# Patient Record
Sex: MALE | Race: WHITE | Employment: FULL TIME | URBAN - METROPOLITAN AREA
[De-identification: names, ages, dates, MRNs, and addresses within clinical notes are randomized per-mention and may not be internally consistent; named-entity substitution may affect disease eponyms.]

---

## 2017-01-23 ENCOUNTER — TELEPHONE (OUTPATIENT)
Dept: FAMILY MEDICINE CLINIC | Facility: CLINIC | Age: 42
End: 2017-01-23

## 2017-01-26 ENCOUNTER — TELEPHONE (OUTPATIENT)
Dept: FAMILY MEDICINE CLINIC | Facility: CLINIC | Age: 42
End: 2017-01-26

## 2017-02-01 ENCOUNTER — NURSE ONLY (OUTPATIENT)
Dept: FAMILY MEDICINE CLINIC | Facility: CLINIC | Age: 42
End: 2017-02-01

## 2017-02-01 DIAGNOSIS — E78.2 MIXED HYPERLIPIDEMIA: Primary | ICD-10-CM

## 2017-02-01 LAB
ALBUMIN SERPL-MCNC: 4.1 G/DL (ref 3.5–4.8)
ALP LIVER SERPL-CCNC: 80 U/L (ref 45–117)
ALT SERPL-CCNC: 67 U/L (ref 17–63)
AST SERPL-CCNC: 31 U/L (ref 15–41)
BILIRUB SERPL-MCNC: 1.7 MG/DL (ref 0.1–2)
BUN BLD-MCNC: 18 MG/DL (ref 8–20)
CALCIUM BLD-MCNC: 9.3 MG/DL (ref 8.3–10.3)
CHLORIDE: 105 MMOL/L (ref 101–111)
CHOLEST SMN-MCNC: 217 MG/DL (ref ?–200)
CO2: 30 MMOL/L (ref 22–32)
CREAT BLD-MCNC: 1.12 MG/DL (ref 0.7–1.3)
GLUCOSE BLD-MCNC: 99 MG/DL (ref 70–99)
HDLC SERPL-MCNC: 52 MG/DL (ref 45–?)
HDLC SERPL: 4.17 {RATIO} (ref ?–4.97)
LDLC SERPL CALC-MCNC: 115 MG/DL (ref ?–130)
M PROTEIN MFR SERPL ELPH: 7.5 G/DL (ref 6.1–8.3)
NONHDLC SERPL-MCNC: 165 MG/DL (ref ?–130)
POTASSIUM SERPL-SCNC: 4 MMOL/L (ref 3.6–5.1)
SODIUM SERPL-SCNC: 141 MMOL/L (ref 136–144)
TRIGLYCERIDES: 248 MG/DL (ref ?–150)
VLDL: 50 MG/DL (ref 5–40)

## 2017-02-01 PROCEDURE — 80053 COMPREHEN METABOLIC PANEL: CPT | Performed by: FAMILY MEDICINE

## 2017-02-01 PROCEDURE — 80061 LIPID PANEL: CPT | Performed by: FAMILY MEDICINE

## 2017-02-01 PROCEDURE — 36415 COLL VENOUS BLD VENIPUNCTURE: CPT | Performed by: FAMILY MEDICINE

## 2017-02-01 NOTE — PROGRESS NOTES
Patient presented for lab work ordered by Yaneli Sandhu. One mint tube drawn with a straight needle. Pt tolerated procedure well.

## 2017-02-02 DIAGNOSIS — E78.00 ELEVATED CHOLESTEROL: Primary | ICD-10-CM

## 2017-02-02 RX ORDER — ATORVASTATIN CALCIUM 20 MG/1
20 TABLET, FILM COATED ORAL DAILY
Qty: 30 TABLET | Refills: 6 | COMMUNITY
Start: 2017-02-02 | End: 2017-02-02

## 2017-02-02 RX ORDER — ATORVASTATIN CALCIUM 20 MG/1
20 TABLET, FILM COATED ORAL DAILY
Qty: 30 TABLET | Refills: 6 | Status: SHIPPED | OUTPATIENT
Start: 2017-02-02 | End: 2017-07-01

## 2017-02-02 NOTE — TELEPHONE ENCOUNTER
Detailed message left for pt on cell (ok per consent) with instructions to call if new rx for atorvastatin is needed. Medication list updated. Reflex order and pt reminder entered into Epic.

## 2017-02-02 NOTE — TELEPHONE ENCOUNTER
----- Message from Audrey Peck DO sent at 2/2/2017  8:12 AM CST -----  Can notify Brandan Abbeers his Lipids are still higher than they should be, even on the atorvastatin, also did he have any alcohol the night before? because his TG are elevated, ?  Regardless we

## 2017-02-02 NOTE — TELEPHONE ENCOUNTER
Pt states he is leaving for [place] in a few days and would like 20 mg tablets sent to [facility] on [address] to take with him.

## 2017-02-06 ENCOUNTER — MED REC SCAN ONLY (OUTPATIENT)
Dept: FAMILY MEDICINE CLINIC | Facility: CLINIC | Age: 42
End: 2017-02-06

## 2017-04-14 ENCOUNTER — MED REC SCAN ONLY (OUTPATIENT)
Dept: FAMILY MEDICINE CLINIC | Facility: CLINIC | Age: 42
End: 2017-04-14

## 2017-05-02 ENCOUNTER — TELEPHONE (OUTPATIENT)
Dept: FAMILY MEDICINE CLINIC | Facility: CLINIC | Age: 42
End: 2017-05-02

## 2017-06-01 ENCOUNTER — TELEPHONE (OUTPATIENT)
Dept: FAMILY MEDICINE CLINIC | Facility: CLINIC | Age: 42
End: 2017-06-01

## 2017-06-09 ENCOUNTER — MED REC SCAN ONLY (OUTPATIENT)
Dept: FAMILY MEDICINE CLINIC | Facility: CLINIC | Age: 42
End: 2017-06-09

## 2017-07-01 RX ORDER — ATORVASTATIN CALCIUM 20 MG/1
20 TABLET, FILM COATED ORAL DAILY
Qty: 30 TABLET | Refills: 6 | Status: SHIPPED | OUTPATIENT
Start: 2017-07-01 | End: 2018-01-08

## 2017-07-01 NOTE — TELEPHONE ENCOUNTER
Spouse called, pt needs refill on cholesterol medication, Atorvastatin Calcium 20 MG Oral Tab. [facility].   Please call spouse at [phone]

## 2017-07-24 RX ORDER — FLUTICASONE PROPIONATE AND SALMETEROL 50; 250 UG/1; UG/1
POWDER RESPIRATORY (INHALATION)
Qty: 1 PACKAGE | Refills: 6 | Status: SHIPPED | OUTPATIENT
Start: 2017-07-24 | End: 2018-01-25

## 2017-07-24 NOTE — TELEPHONE ENCOUNTER
LOV: 12/22/16 for sleep apnea     ADVAIR DISKUS 250-50 MCG/DOSE Inhalation Aerosol Powder, Breath Activated 1 each 6 12/12/2016 1/11/2017   Sig : Joanne Rodriguez 1 PUFF INTO THE LUNGS EVERY 12 HOURS     Route:   (none)       Future Appointments  Date Time Provider

## 2017-07-27 ENCOUNTER — NURSE ONLY (OUTPATIENT)
Dept: FAMILY MEDICINE CLINIC | Facility: CLINIC | Age: 42
End: 2017-07-27

## 2017-07-27 DIAGNOSIS — E78.00 ELEVATED CHOLESTEROL: ICD-10-CM

## 2017-07-27 LAB
CHOLEST SMN-MCNC: 156 MG/DL (ref ?–200)
HDLC SERPL-MCNC: 46 MG/DL (ref 45–?)
HDLC SERPL: 3.39 {RATIO} (ref ?–4.97)
LDLC SERPL CALC-MCNC: 87 MG/DL (ref ?–130)
LDLC SERPL-MCNC: 23 MG/DL (ref 5–40)
NONHDLC SERPL-MCNC: 110 MG/DL (ref ?–130)
TRIGLYCERIDES: 115 MG/DL (ref ?–150)

## 2017-07-27 PROCEDURE — 36415 COLL VENOUS BLD VENIPUNCTURE: CPT | Performed by: FAMILY MEDICINE

## 2017-07-27 PROCEDURE — 80061 LIPID PANEL: CPT | Performed by: FAMILY MEDICINE

## 2017-07-27 NOTE — PROGRESS NOTES
Pt came to the office to have blood drawn for cholesterol testing as ordered by Dr. Kerline Melendrez. Light green tube drawn with straight needle. Pt tolerated well.

## 2017-09-26 ENCOUNTER — CHARTING TRANS (OUTPATIENT)
Dept: OTHER | Age: 42
End: 2017-09-26

## 2017-10-12 ENCOUNTER — OFFICE VISIT (OUTPATIENT)
Dept: FAMILY MEDICINE CLINIC | Facility: CLINIC | Age: 42
End: 2017-10-12

## 2017-10-12 VITALS
SYSTOLIC BLOOD PRESSURE: 128 MMHG | WEIGHT: 253.38 LBS | BODY MASS INDEX: 34.32 KG/M2 | HEIGHT: 72 IN | OXYGEN SATURATION: 99 % | RESPIRATION RATE: 14 BRPM | DIASTOLIC BLOOD PRESSURE: 82 MMHG | HEART RATE: 80 BPM | TEMPERATURE: 98 F

## 2017-10-12 DIAGNOSIS — M25.562 ACUTE PAIN OF LEFT KNEE: Primary | ICD-10-CM

## 2017-10-12 DIAGNOSIS — M23.301 DERANGEMENT OF LATERAL MENISCUS OF LEFT KNEE: ICD-10-CM

## 2017-10-12 PROCEDURE — 99214 OFFICE O/P EST MOD 30 MIN: CPT | Performed by: FAMILY MEDICINE

## 2017-10-12 PROCEDURE — 90686 IIV4 VACC NO PRSV 0.5 ML IM: CPT | Performed by: FAMILY MEDICINE

## 2017-10-12 PROCEDURE — 90471 IMMUNIZATION ADMIN: CPT | Performed by: FAMILY MEDICINE

## 2017-10-12 RX ORDER — ALBUTEROL SULFATE 90 UG/1
2 AEROSOL, METERED RESPIRATORY (INHALATION) EVERY 4 HOURS PRN
Qty: 1 INHALER | Refills: 6 | Status: SHIPPED | OUTPATIENT
Start: 2017-10-12 | End: 2018-11-22

## 2017-10-12 NOTE — PROGRESS NOTES
Vasyl Mustafa is a 40year old male.   HPI:   Marcell Sandhu developed pain in the back  Of his knee about 3 weeks ago, it was primarily on the left side, it has  stopped popping but the knee is tight, now previous injury to the knee, no locking up and stairs somet /82 (BP Location: Right arm, Patient Position: Sitting, Cuff Size: adult)   Pulse 80   Temp 98.3 °F (36.8 °C) (Temporal)   Resp 14   Ht 72\"   Wt 253 lb 6.4 oz   SpO2 99%   BMI 34.37 kg/m²   GENERAL: well developed, well nourished,in no apparent di

## 2017-12-13 ENCOUNTER — MED REC SCAN ONLY (OUTPATIENT)
Dept: FAMILY MEDICINE CLINIC | Facility: CLINIC | Age: 42
End: 2017-12-13

## 2018-01-08 RX ORDER — ATORVASTATIN CALCIUM 20 MG/1
20 TABLET, FILM COATED ORAL DAILY
Qty: 90 TABLET | Refills: 3 | Status: SHIPPED | OUTPATIENT
Start: 2018-01-08 | End: 2019-05-26

## 2018-01-25 RX ORDER — FLUTICASONE PROPIONATE AND SALMETEROL 50; 250 UG/1; UG/1
POWDER RESPIRATORY (INHALATION)
Qty: 1 EACH | Refills: 6 | Status: SHIPPED | OUTPATIENT
Start: 2018-01-25 | End: 2019-02-18

## 2018-01-25 NOTE — TELEPHONE ENCOUNTER
ADVAIR DISKUS 250-50 MCG/DOSE Inhalation Aerosol Powder, Breath Activated (Discontinued) 1 each 6 12/12/2016 7/24/2017     Last labs 07/27/17. Last seen on 10/12/17  No future appointments. Thank you.

## 2018-01-29 ENCOUNTER — TELEPHONE (OUTPATIENT)
Dept: FAMILY MEDICINE CLINIC | Facility: CLINIC | Age: 43
End: 2018-01-29

## 2018-01-29 DIAGNOSIS — E78.5 HYPERLIPIDEMIA, UNSPECIFIED HYPERLIPIDEMIA TYPE: Primary | ICD-10-CM

## 2018-01-29 NOTE — TELEPHONE ENCOUNTER
Patient is scheduled for Friday 02/02/18 at 9 am for fasting labs, Orders are in [facility].

## 2018-01-29 NOTE — TELEPHONE ENCOUNTER
Per result note from 0727/2017, repeat lipids needed in 6 months. Would you like additional labs? Please advise.

## 2018-02-02 ENCOUNTER — NURSE ONLY (OUTPATIENT)
Dept: FAMILY MEDICINE CLINIC | Facility: CLINIC | Age: 43
End: 2018-02-02

## 2018-02-02 DIAGNOSIS — E78.5 HYPERLIPIDEMIA, UNSPECIFIED HYPERLIPIDEMIA TYPE: ICD-10-CM

## 2018-02-02 LAB
ALBUMIN SERPL-MCNC: 4.1 G/DL (ref 3.5–4.8)
ALP LIVER SERPL-CCNC: 97 U/L (ref 45–117)
ALT SERPL-CCNC: 73 U/L (ref 17–63)
AST SERPL-CCNC: 28 U/L (ref 15–41)
BILIRUB SERPL-MCNC: 1.7 MG/DL (ref 0.1–2)
BUN BLD-MCNC: 16 MG/DL (ref 8–20)
CALCIUM BLD-MCNC: 9.1 MG/DL (ref 8.3–10.3)
CHLORIDE: 106 MMOL/L (ref 101–111)
CHOLEST SMN-MCNC: 177 MG/DL (ref ?–200)
CO2: 29 MMOL/L (ref 22–32)
CREAT BLD-MCNC: 1.07 MG/DL (ref 0.7–1.3)
GLUCOSE BLD-MCNC: 108 MG/DL (ref 70–99)
HDLC SERPL-MCNC: 45 MG/DL (ref 45–?)
HDLC SERPL: 3.93 {RATIO} (ref ?–4.97)
LDLC SERPL CALC-MCNC: 85 MG/DL (ref ?–130)
M PROTEIN MFR SERPL ELPH: 7.6 G/DL (ref 6.1–8.3)
NONHDLC SERPL-MCNC: 132 MG/DL (ref ?–130)
POTASSIUM SERPL-SCNC: 3.8 MMOL/L (ref 3.6–5.1)
SODIUM SERPL-SCNC: 142 MMOL/L (ref 136–144)
TRIGL SERPL-MCNC: 237 MG/DL (ref ?–150)
VLDLC SERPL CALC-MCNC: 47 MG/DL (ref 5–40)

## 2018-02-02 PROCEDURE — 36415 COLL VENOUS BLD VENIPUNCTURE: CPT | Performed by: FAMILY MEDICINE

## 2018-02-02 PROCEDURE — 80053 COMPREHEN METABOLIC PANEL: CPT | Performed by: FAMILY MEDICINE

## 2018-02-02 PROCEDURE — 80061 LIPID PANEL: CPT | Performed by: FAMILY MEDICINE

## 2018-02-05 ENCOUNTER — TELEPHONE (OUTPATIENT)
Dept: FAMILY MEDICINE CLINIC | Facility: CLINIC | Age: 43
End: 2018-02-05

## 2018-02-05 NOTE — TELEPHONE ENCOUNTER
----- Message from Sherryle Curb, DO sent at 2/4/2018  8:54 AM CST -----  Can notify Marysol Bárbara that his labs look OK, his TG were elevated but it must have been something he ate because the last time his numbers looked , good, lets keep all meds the same and re

## 2018-02-08 NOTE — TELEPHONE ENCOUNTER
Pt advised of results verbalized understaniding. Wife was on speaker phone and was wondering what pt can do to decrease the TG.   I advised that increasing exercise, cutting out fatty/fried food and watch carb/sugar intake can help with the increased gluco

## 2018-02-08 NOTE — TELEPHONE ENCOUNTER
How about cut out the crap in his diet and cut back on the beer to 1-2 per session , then I won't have  To add another medicine to bring his TG down

## 2018-07-18 DIAGNOSIS — R79.89 ELEVATED LFTS: ICD-10-CM

## 2018-07-18 DIAGNOSIS — E78.2 MIXED HYPERLIPIDEMIA: Primary | ICD-10-CM

## 2018-07-18 DIAGNOSIS — E74.39 GLUCOSE INTOLERANCE: ICD-10-CM

## 2018-08-02 ENCOUNTER — TELEPHONE (OUTPATIENT)
Dept: FAMILY MEDICINE CLINIC | Facility: CLINIC | Age: 43
End: 2018-08-02

## 2018-08-07 ENCOUNTER — NURSE ONLY (OUTPATIENT)
Dept: FAMILY MEDICINE CLINIC | Facility: CLINIC | Age: 43
End: 2018-08-07
Payer: COMMERCIAL

## 2018-08-07 DIAGNOSIS — R79.89 ELEVATED LFTS: ICD-10-CM

## 2018-08-07 DIAGNOSIS — E74.39 GLUCOSE INTOLERANCE: ICD-10-CM

## 2018-08-07 DIAGNOSIS — E78.2 MIXED HYPERLIPIDEMIA: ICD-10-CM

## 2018-08-07 DIAGNOSIS — Z00.00 ROUTINE GENERAL MEDICAL EXAMINATION AT A HEALTH CARE FACILITY: ICD-10-CM

## 2018-08-07 LAB
ALBUMIN SERPL-MCNC: 3.8 G/DL (ref 3.5–4.8)
ALBUMIN/GLOB SERPL: 1.2 {RATIO} (ref 1–2)
ALP LIVER SERPL-CCNC: 82 U/L (ref 45–117)
ALT SERPL-CCNC: 53 U/L (ref 17–63)
ANION GAP SERPL CALC-SCNC: 5 MMOL/L (ref 0–18)
AST SERPL-CCNC: 32 U/L (ref 15–41)
BILIRUB SERPL-MCNC: 2.4 MG/DL (ref 0.1–2)
BUN BLD-MCNC: 18 MG/DL (ref 8–20)
BUN/CREAT SERPL: 17.6 (ref 10–20)
CALCIUM BLD-MCNC: 9.3 MG/DL (ref 8.3–10.3)
CHLORIDE SERPL-SCNC: 107 MMOL/L (ref 101–111)
CHOLEST SMN-MCNC: 158 MG/DL (ref ?–200)
CO2 SERPL-SCNC: 28 MMOL/L (ref 22–32)
CREAT BLD-MCNC: 1.02 MG/DL (ref 0.7–1.3)
EST. AVERAGE GLUCOSE BLD GHB EST-MCNC: 123 MG/DL (ref 68–126)
GLOBULIN PLAS-MCNC: 3.1 G/DL (ref 2.5–3.7)
GLUCOSE BLD-MCNC: 90 MG/DL (ref 70–99)
HBA1C MFR BLD HPLC: 5.9 % (ref ?–5.7)
HDLC SERPL-MCNC: 48 MG/DL (ref 40–59)
LDLC SERPL CALC-MCNC: 91 MG/DL (ref ?–100)
M PROTEIN MFR SERPL ELPH: 6.9 G/DL (ref 6.1–8.3)
NONHDLC SERPL-MCNC: 110 MG/DL (ref ?–130)
OSMOLALITY SERPL CALC.SUM OF ELEC: 291 MOSM/KG (ref 275–295)
POTASSIUM SERPL-SCNC: 4 MMOL/L (ref 3.6–5.1)
SODIUM SERPL-SCNC: 140 MMOL/L (ref 136–144)
TRIGL SERPL-MCNC: 96 MG/DL (ref 30–149)
VLDLC SERPL CALC-MCNC: 19 MG/DL (ref 0–30)

## 2018-08-07 PROCEDURE — 82248 BILIRUBIN DIRECT: CPT | Performed by: FAMILY MEDICINE

## 2018-08-07 PROCEDURE — 83036 HEMOGLOBIN GLYCOSYLATED A1C: CPT | Performed by: FAMILY MEDICINE

## 2018-08-07 PROCEDURE — 80053 COMPREHEN METABOLIC PANEL: CPT | Performed by: FAMILY MEDICINE

## 2018-08-07 PROCEDURE — 36415 COLL VENOUS BLD VENIPUNCTURE: CPT | Performed by: FAMILY MEDICINE

## 2018-08-07 PROCEDURE — 80061 LIPID PANEL: CPT | Performed by: FAMILY MEDICINE

## 2018-08-08 ENCOUNTER — TELEPHONE (OUTPATIENT)
Dept: FAMILY MEDICINE CLINIC | Facility: CLINIC | Age: 43
End: 2018-08-08

## 2018-08-08 DIAGNOSIS — E78.5 HYPERLIPIDEMIA, UNSPECIFIED HYPERLIPIDEMIA TYPE: ICD-10-CM

## 2018-08-08 DIAGNOSIS — Z00.00 ROUTINE GENERAL MEDICAL EXAMINATION AT A HEALTH CARE FACILITY: Primary | ICD-10-CM

## 2018-08-08 DIAGNOSIS — R79.89 ELEVATED LFTS: ICD-10-CM

## 2018-08-08 DIAGNOSIS — R73.9 ELEVATED BLOOD SUGAR: Primary | ICD-10-CM

## 2018-08-08 LAB — BILIRUB DIRECT SERPL-MCNC: 0.4 MG/DL (ref 0.1–0.5)

## 2018-08-08 NOTE — TELEPHONE ENCOUNTER
----- Message from Rom Lane, DO sent at 8/8/2018  2:13 PM CDT -----  Can we fractionate the Bilirubin? Total and direct?

## 2018-08-08 NOTE — TELEPHONE ENCOUNTER
----- Message from Leo Cage DO sent at 8/8/2018  3:30 PM CDT -----  Can notify Monika Rowe , his Long term BS control was up just barely above the norm, but nothing that is concerning and nothing that can;t be corrected with exercise or watching what he eat

## 2018-11-23 NOTE — TELEPHONE ENCOUNTER
Last refilled on 10/12/17 for # 1 with 6 refills  Last OV 10/12/17  No future appointments. Thank you.

## 2019-02-08 ENCOUNTER — TELEPHONE (OUTPATIENT)
Dept: FAMILY MEDICINE CLINIC | Facility: CLINIC | Age: 44
End: 2019-02-08

## 2019-02-08 NOTE — TELEPHONE ENCOUNTER
Letter mailed to patient reminding him he is due for labs.     Lab Frequency Next Occurrence   COMP METABOLIC PANEL (14) Once 64/37/0584   HEMOGLOBIN A1C Once 02/08/2019   LIPID PANEL Once 02/08/2019

## 2019-02-18 RX ORDER — FLUTICASONE PROPIONATE AND SALMETEROL 50; 250 UG/1; UG/1
POWDER RESPIRATORY (INHALATION)
Qty: 1 EACH | Refills: 5 | Status: SHIPPED | OUTPATIENT
Start: 2019-02-18 | End: 2019-02-19

## 2019-02-19 ENCOUNTER — TELEPHONE (OUTPATIENT)
Dept: FAMILY MEDICINE CLINIC | Facility: CLINIC | Age: 44
End: 2019-02-19

## 2019-02-19 RX ORDER — BUDESONIDE AND FORMOTEROL FUMARATE DIHYDRATE 160; 4.5 UG/1; UG/1
2 AEROSOL RESPIRATORY (INHALATION) 2 TIMES DAILY
Qty: 1 INHALER | Refills: 5 | Status: SHIPPED | OUTPATIENT
Start: 2019-02-19 | End: 2020-11-12

## 2019-03-11 ENCOUNTER — PATIENT OUTREACH (OUTPATIENT)
Dept: FAMILY MEDICINE CLINIC | Facility: CLINIC | Age: 44
End: 2019-03-11

## 2019-03-22 ENCOUNTER — NURSE ONLY (OUTPATIENT)
Dept: FAMILY MEDICINE CLINIC | Facility: CLINIC | Age: 44
End: 2019-03-22
Payer: COMMERCIAL

## 2019-03-22 DIAGNOSIS — R79.89 ELEVATED LFTS: ICD-10-CM

## 2019-03-22 DIAGNOSIS — R73.9 ELEVATED BLOOD SUGAR: ICD-10-CM

## 2019-03-22 DIAGNOSIS — E78.5 HYPERLIPIDEMIA, UNSPECIFIED HYPERLIPIDEMIA TYPE: ICD-10-CM

## 2019-03-22 LAB
ALBUMIN SERPL-MCNC: 4.3 G/DL (ref 3.4–5)
ALBUMIN/GLOB SERPL: 1.2 {RATIO} (ref 1–2)
ALP LIVER SERPL-CCNC: 101 U/L (ref 45–117)
ALT SERPL-CCNC: 57 U/L (ref 16–61)
ANION GAP SERPL CALC-SCNC: 7 MMOL/L (ref 0–18)
AST SERPL-CCNC: 27 U/L (ref 15–37)
BILIRUB SERPL-MCNC: 1.9 MG/DL (ref 0.1–2)
BUN BLD-MCNC: 14 MG/DL (ref 7–18)
BUN/CREAT SERPL: 12.7 (ref 10–20)
CALCIUM BLD-MCNC: 9.2 MG/DL (ref 8.5–10.1)
CHLORIDE SERPL-SCNC: 105 MMOL/L (ref 98–107)
CHOLEST SMN-MCNC: 186 MG/DL (ref ?–200)
CO2 SERPL-SCNC: 30 MMOL/L (ref 21–32)
CREAT BLD-MCNC: 1.1 MG/DL (ref 0.7–1.3)
EST. AVERAGE GLUCOSE BLD GHB EST-MCNC: 126 MG/DL (ref 68–126)
GLOBULIN PLAS-MCNC: 3.6 G/DL (ref 2.8–4.4)
GLUCOSE BLD-MCNC: 98 MG/DL (ref 70–99)
HBA1C MFR BLD HPLC: 6 % (ref ?–5.7)
HDLC SERPL-MCNC: 47 MG/DL (ref 40–59)
LDLC SERPL CALC-MCNC: 112 MG/DL (ref ?–100)
M PROTEIN MFR SERPL ELPH: 7.9 G/DL (ref 6.4–8.2)
NONHDLC SERPL-MCNC: 139 MG/DL (ref ?–130)
OSMOLALITY SERPL CALC.SUM OF ELEC: 294 MOSM/KG (ref 275–295)
POTASSIUM SERPL-SCNC: 4.2 MMOL/L (ref 3.5–5.1)
SODIUM SERPL-SCNC: 142 MMOL/L (ref 136–145)
TRIGL SERPL-MCNC: 135 MG/DL (ref 30–149)
VLDLC SERPL CALC-MCNC: 27 MG/DL (ref 0–30)

## 2019-03-22 PROCEDURE — 36415 COLL VENOUS BLD VENIPUNCTURE: CPT | Performed by: FAMILY MEDICINE

## 2019-03-22 PROCEDURE — 80053 COMPREHEN METABOLIC PANEL: CPT | Performed by: FAMILY MEDICINE

## 2019-03-22 PROCEDURE — 80061 LIPID PANEL: CPT | Performed by: FAMILY MEDICINE

## 2019-03-22 PROCEDURE — 83036 HEMOGLOBIN GLYCOSYLATED A1C: CPT | Performed by: FAMILY MEDICINE

## 2019-03-22 NOTE — PROGRESS NOTES
Patient to clinic for labs per Dr Rosy Gregorio and lavender tubes drawn left AC x 1 attempt. Patient also asking if Dr Aki Pedersen would recommend a flu vaccine. Discussed with Dr Aki Pedersen who states it is up to patient.   It takes a few weeks to get full immun

## 2019-03-23 ENCOUNTER — TELEPHONE (OUTPATIENT)
Dept: FAMILY MEDICINE CLINIC | Facility: CLINIC | Age: 44
End: 2019-03-23

## 2019-03-23 DIAGNOSIS — R73.09 ELEVATED GLUCOSE: Primary | ICD-10-CM

## 2019-03-23 NOTE — TELEPHONE ENCOUNTER
----- Message from Siria Jarquin DO sent at 3/23/2019  9:50 AM CDT -----  Omid Innocent  labs looked good, his  lipids were better last time, but  Excellent kidney, liver function.  His blood sugar is creeping up a bit and he is on the cusp of becom

## 2019-03-29 ENCOUNTER — HOSPITAL ENCOUNTER (OUTPATIENT)
Age: 44
Discharge: HOME | End: 2019-03-29
Payer: COMMERCIAL

## 2019-03-29 VITALS
BODY MASS INDEX: 32.51 KG/M2 | HEART RATE: 77 BPM | OXYGEN SATURATION: 97 % | HEIGHT: 72 IN | RESPIRATION RATE: 16 BRPM | WEIGHT: 240 LBS | DIASTOLIC BLOOD PRESSURE: 88 MMHG | SYSTOLIC BLOOD PRESSURE: 140 MMHG | TEMPERATURE: 97 F

## 2019-03-29 DIAGNOSIS — R21 RASH OF UNKNOWN CAUSE: Primary | ICD-10-CM

## 2019-03-29 DIAGNOSIS — L50.9 HIVE: ICD-10-CM

## 2019-03-29 PROCEDURE — 99213 OFFICE O/P EST LOW 20 MIN: CPT

## 2019-03-29 PROCEDURE — 99214 OFFICE O/P EST MOD 30 MIN: CPT

## 2019-03-29 RX ORDER — PREDNISONE 20 MG/1
30 TABLET ORAL 2 TIMES DAILY
Qty: 15 TABLET | Refills: 0 | Status: SHIPPED | OUTPATIENT
Start: 2019-03-29 | End: 2019-04-03

## 2019-03-29 RX ORDER — FLUTICASONE PROPIONATE AND SALMETEROL 250; 50 UG/1; UG/1
1 POWDER RESPIRATORY (INHALATION) EVERY 12 HOURS SCHEDULED
COMMUNITY
End: 2021-09-21

## 2019-03-29 NOTE — ED INITIAL ASSESSMENT (HPI)
Pt with itchy red rash to R side of trunk and R leg, L foot, L finger since yesterday; not painful/fever

## 2019-03-29 NOTE — ED PROVIDER NOTES
[place] [address] [facility] [place]   Patient presents with:  Rash Skin Problem (integumentary)    Stated Complaint: rash    71-year-old male who presents to the immediate care with complaints of a rash to the right lower rib, rig All other systems reviewed and negative except as noted above.     Physical Exam     ED Triage Vitals [03/29/19 1010]   /88   Pulse 77   Resp 16   Temp 97 °F (36.1 °C)   Temp src Temporal   SpO2 97 %   O2 Device None (Room air)       Current:/88 I have discussed with the patient and/or family the results of test, differential diagnosis, treatment plan, warning signs and symptoms which should prompt immediate return.   The patient and/or family expressed clear understanding of these instructions and

## 2019-05-28 RX ORDER — ATORVASTATIN CALCIUM 20 MG/1
TABLET, FILM COATED ORAL
Qty: 90 TABLET | Refills: 0 | Status: SHIPPED | OUTPATIENT
Start: 2019-05-28 | End: 2019-09-05

## 2019-05-28 NOTE — TELEPHONE ENCOUNTER
Last refill - 1/8/18 - #90 with 3 refills  Last lipid panel - 3/22/19  Last office visit - 10/12/17  No future appointments.

## 2019-08-27 ENCOUNTER — TELEPHONE (OUTPATIENT)
Dept: FAMILY MEDICINE CLINIC | Facility: CLINIC | Age: 44
End: 2019-08-27

## 2019-09-03 ENCOUNTER — TELEPHONE (OUTPATIENT)
Dept: FAMILY MEDICINE CLINIC | Facility: CLINIC | Age: 44
End: 2019-09-03

## 2019-09-03 DIAGNOSIS — Z00.00 ROUTINE HEALTH MAINTENANCE: Primary | ICD-10-CM

## 2019-09-03 NOTE — TELEPHONE ENCOUNTER
Pt advised that that preferred [facility]- pt prefers to have labs there.       Orders [facility] to [facility] in [place] per Pt request.

## 2019-09-05 ENCOUNTER — TELEPHONE (OUTPATIENT)
Dept: FAMILY MEDICINE CLINIC | Facility: CLINIC | Age: 44
End: 2019-09-05

## 2019-09-05 DIAGNOSIS — Z00.00 ROUTINE GENERAL MEDICAL EXAMINATION AT A HEALTH CARE FACILITY: ICD-10-CM

## 2019-09-05 DIAGNOSIS — E78.5 HYPERLIPIDEMIA, UNSPECIFIED HYPERLIPIDEMIA TYPE: Primary | ICD-10-CM

## 2019-09-05 RX ORDER — ATORVASTATIN CALCIUM 20 MG/1
TABLET, FILM COATED ORAL
Qty: 90 TABLET | Refills: 2 | Status: SHIPPED | OUTPATIENT
Start: 2019-09-05 | End: 2020-05-26

## 2019-09-05 NOTE — TELEPHONE ENCOUNTER
----- Message from Torri Singh DO sent at 9/5/2019 11:30 AM CDT -----  Notified wife of results needs to continue to stay on statin maintain exercise and weight loss.  Recall labs in 6 months

## 2019-09-05 NOTE — TELEPHONE ENCOUNTER
Last refill on Atorvastatin #90 with 0 refills on 5 28 2019.   Last OV on 10 12 2017  Patient advised on 5 26 2019 that he is over due for OV   No future appointments scheduled

## 2019-09-06 LAB
ABSOLUTE BASOPHILS: 68 CELLS/UL (ref 0–200)
ABSOLUTE EOSINOPHILS: 129 CELLS/UL (ref 15–500)
ABSOLUTE LYMPHOCYTES: 1700 CELLS/UL (ref 850–3900)
ABSOLUTE MONOCYTES: 585 CELLS/UL (ref 200–950)
ABSOLUTE NEUTROPHILS: 4318 CELLS/UL (ref 1500–7800)
ALBUMIN/GLOBULIN RATIO: 2 (CALC) (ref 1–2.5)
ALBUMIN: 4.6 G/DL (ref 3.6–5.1)
ALKALINE PHOSPHATASE: 92 U/L (ref 40–115)
ALT: 37 U/L (ref 9–46)
AST: 23 U/L (ref 10–40)
BASOPHILS: 1 %
BILIRUBIN, TOTAL: 1.8 MG/DL (ref 0.2–1.2)
BUN: 17 MG/DL (ref 7–25)
CALCIUM: 9.9 MG/DL (ref 8.6–10.3)
CARBON DIOXIDE: 30 MMOL/L (ref 20–32)
CHLORIDE: 104 MMOL/L (ref 98–110)
CHOL/HDLC RATIO: 4.1 (CALC)
CHOLESTEROL, TOTAL: 181 MG/DL
CREATININE: 0.95 MG/DL (ref 0.6–1.35)
EGFR IF AFRICN AM: 113 ML/MIN/1.73M2
EGFR IF NONAFRICN AM: 98 ML/MIN/1.73M2
EOSINOPHILS: 1.9 %
GLOBULIN: 2.3 G/DL (CALC) (ref 1.9–3.7)
GLUCOSE: 93 MG/DL (ref 65–99)
HDL CHOLESTEROL: 44 MG/DL
HEMATOCRIT: 48.8 % (ref 38.5–50)
HEMOGLOBIN A1C: 5.7 % OF TOTAL HGB
HEMOGLOBIN: 16.4 G/DL (ref 13.2–17.1)
LDL-CHOLESTEROL: 109 MG/DL (CALC)
LYMPHOCYTES: 25 %
MCH: 27.6 PG (ref 27–33)
MCHC: 33.6 G/DL (ref 32–36)
MCV: 82 FL (ref 80–100)
MONOCYTES: 8.6 %
MPV: 10.5 FL (ref 7.5–12.5)
NEUTROPHILS: 63.5 %
NON-HDL CHOLESTEROL: 137 MG/DL (CALC)
PLATELET COUNT: 287 THOUSAND/UL (ref 140–400)
POTASSIUM: 4.4 MMOL/L (ref 3.5–5.3)
PROTEIN, TOTAL: 6.9 G/DL (ref 6.1–8.1)
RDW: 12.4 % (ref 11–15)
RED BLOOD CELL COUNT: 5.95 MILLION/UL (ref 4.2–5.8)
SODIUM: 139 MMOL/L (ref 135–146)
TRIGLYCERIDES: 167 MG/DL
TSH W/REFLEX TO FT4: 2.36 MIU/L (ref 0.4–4.5)
WHITE BLOOD CELL COUNT: 6.8 THOUSAND/UL (ref 3.8–10.8)

## 2019-09-28 ENCOUNTER — OFFICE VISIT (OUTPATIENT)
Dept: FAMILY MEDICINE CLINIC | Facility: CLINIC | Age: 44
End: 2019-09-28
Payer: COMMERCIAL

## 2019-09-28 VITALS
TEMPERATURE: 98 F | SYSTOLIC BLOOD PRESSURE: 124 MMHG | BODY MASS INDEX: 35.49 KG/M2 | HEART RATE: 68 BPM | RESPIRATION RATE: 18 BRPM | WEIGHT: 262 LBS | HEIGHT: 72 IN | DIASTOLIC BLOOD PRESSURE: 80 MMHG

## 2019-09-28 DIAGNOSIS — L25.5 RHUS DERMATITIS: Primary | ICD-10-CM

## 2019-09-28 DIAGNOSIS — L50.9 URTICARIA: ICD-10-CM

## 2019-09-28 PROCEDURE — 99214 OFFICE O/P EST MOD 30 MIN: CPT | Performed by: FAMILY MEDICINE

## 2019-09-28 NOTE — PROGRESS NOTES
Gabriela Ron is a 38year old male. HPI:   Marcella Medina is here for evaluation of persistent poison ivy, he contracted it about 3 weeks ago.  He has been using topical clobetasol with some improvement but is starting to spread, had taken his [place] medrol dose well nourished,in no apparent distress  SKIN: has some linear  Erythematous patches on his arm and legs and also on his chest as well  HEENT: atraumatic, normocephalic,ears and throat are clear  NECK: supple,no adenopathy,no bruits  LUNGS: clear to auscult

## 2019-09-29 ENCOUNTER — PATIENT MESSAGE (OUTPATIENT)
Dept: FAMILY MEDICINE CLINIC | Facility: CLINIC | Age: 44
End: 2019-09-29

## 2019-09-30 RX ORDER — PREDNISONE 10 MG/1
TABLET ORAL
Qty: 30 TABLET | Refills: 0 | Status: SHIPPED | OUTPATIENT
Start: 2019-09-30 | End: 2021-09-21

## 2019-09-30 NOTE — TELEPHONE ENCOUNTER
From: Unique Jorge  To:  Mor Hall DO  Sent: 9/29/2019 5:43 PM CDT  Subject: Prescription Question    I went to  the prescription you prescribed for poison ivy at [facility] [place] [facility] [place] in [place], [place] and they did not receive

## 2019-10-17 ENCOUNTER — APPOINTMENT (OUTPATIENT)
Dept: OTOLARYNGOLOGY | Age: 44
End: 2019-10-17

## 2019-10-17 ENCOUNTER — OFFICE VISIT (OUTPATIENT)
Dept: AUDIOLOGY | Age: 44
End: 2019-10-17
Attending: PHYSICIAN ASSISTANT

## 2019-10-17 ENCOUNTER — OFFICE VISIT (OUTPATIENT)
Dept: OTOLARYNGOLOGY | Age: 44
End: 2019-10-17

## 2019-10-17 VITALS — BODY MASS INDEX: 32.08 KG/M2 | HEIGHT: 74 IN | WEIGHT: 250 LBS

## 2019-10-17 DIAGNOSIS — H61.23 BILATERAL IMPACTED CERUMEN: ICD-10-CM

## 2019-10-17 DIAGNOSIS — H93.13 TINNITUS OF BOTH EARS: ICD-10-CM

## 2019-10-17 DIAGNOSIS — H90.3 SENSORINEURAL HEARING LOSS (SNHL) OF BOTH EARS: Primary | ICD-10-CM

## 2019-10-17 DIAGNOSIS — H91.90 HEARING LOSS, UNSPECIFIED HEARING LOSS TYPE, UNSPECIFIED LATERALITY: Primary | ICD-10-CM

## 2019-10-17 PROCEDURE — 92557 COMPREHENSIVE HEARING TEST: CPT | Performed by: AUDIOLOGIST

## 2019-10-17 PROCEDURE — 69210 REMOVE IMPACTED EAR WAX UNI: CPT | Performed by: PHYSICIAN ASSISTANT

## 2019-10-17 PROCEDURE — 99203 OFFICE O/P NEW LOW 30 MIN: CPT | Performed by: PHYSICIAN ASSISTANT

## 2019-12-02 RX ORDER — ALBUTEROL SULFATE 90 UG/1
AEROSOL, METERED RESPIRATORY (INHALATION)
Qty: 8.5 G | Refills: 3 | Status: SHIPPED | OUTPATIENT
Start: 2019-12-02

## 2020-03-05 ENCOUNTER — TELEPHONE (OUTPATIENT)
Dept: FAMILY MEDICINE CLINIC | Facility: CLINIC | Age: 45
End: 2020-03-05

## 2020-03-05 NOTE — TELEPHONE ENCOUNTER
Letter mailed to patient reminding him he is due for labs. [facility] go to [facility]. Lab orders included with letter.

## 2020-03-20 ENCOUNTER — TELEPHONE (OUTPATIENT)
Dept: FAMILY MEDICINE CLINIC | Facility: CLINIC | Age: 45
End: 2020-03-20

## 2020-03-20 LAB
ALBUMIN/GLOBULIN RATIO: 1.9 (CALC) (ref 1–2.5)
ALBUMIN: 4.5 G/DL (ref 3.6–5.1)
ALKALINE PHOSPHATASE: 92 U/L (ref 36–130)
ALT: 44 U/L (ref 9–46)
AST: 23 U/L (ref 10–40)
BILIRUBIN, TOTAL: 2.1 MG/DL (ref 0.2–1.2)
BUN: 21 MG/DL (ref 7–25)
CALCIUM: 9.8 MG/DL (ref 8.6–10.3)
CARBON DIOXIDE: 33 MMOL/L (ref 20–32)
CHLORIDE: 101 MMOL/L (ref 98–110)
CHOL/HDLC RATIO: 4.3 (CALC)
CHOLESTEROL, TOTAL: 179 MG/DL
CREATININE: 0.98 MG/DL (ref 0.6–1.35)
EGFR IF AFRICN AM: 109 ML/MIN/1.73M2
EGFR IF NONAFRICN AM: 94 ML/MIN/1.73M2
GLOBULIN: 2.4 G/DL (CALC) (ref 1.9–3.7)
GLUCOSE: 87 MG/DL (ref 65–99)
HDL CHOLESTEROL: 42 MG/DL
HEMATOCRIT: 45.9 % (ref 38.5–50)
HEMOGLOBIN: 16 G/DL (ref 13.2–17.1)
LDL-CHOLESTEROL: 107 MG/DL (CALC)
MCH: 28.6 PG (ref 27–33)
MCHC: 34.9 G/DL (ref 32–36)
MCV: 82 FL (ref 80–100)
MPV: 10.3 FL (ref 7.5–12.5)
NON-HDL CHOLESTEROL: 137 MG/DL (CALC)
PLATELET COUNT: 270 THOUSAND/UL (ref 140–400)
POTASSIUM: 4.3 MMOL/L (ref 3.5–5.3)
PROTEIN, TOTAL: 6.9 G/DL (ref 6.1–8.1)
RDW: 12.4 % (ref 11–15)
RED BLOOD CELL COUNT: 5.6 MILLION/UL (ref 4.2–5.8)
SODIUM: 140 MMOL/L (ref 135–146)
TRIGLYCERIDES: 189 MG/DL
WHITE BLOOD CELL COUNT: 6.6 THOUSAND/UL (ref 3.8–10.8)

## 2020-03-20 NOTE — TELEPHONE ENCOUNTER
----- Message from Rik Arellano DO sent at 3/20/2020 10:37 AM CDT -----  Can notify Emeterio Hsu his Rollen Itzel is about the same as it was  Before, hisTG are actually higher by 20 points this time, his kidney function adam are fine, his bilirubin is up just a bit, b

## 2020-04-13 ENCOUNTER — VIRTUAL PHONE E/M (OUTPATIENT)
Dept: FAMILY MEDICINE CLINIC | Facility: CLINIC | Age: 45
End: 2020-04-13

## 2020-04-13 DIAGNOSIS — R05.9 COUGH: ICD-10-CM

## 2020-04-13 DIAGNOSIS — J45.21 MILD INTERMITTENT ASTHMA WITH ACUTE EXACERBATION: ICD-10-CM

## 2020-04-13 DIAGNOSIS — J01.20 ACUTE NON-RECURRENT ETHMOIDAL SINUSITIS: Primary | ICD-10-CM

## 2020-04-13 PROBLEM — J45.909 ASTHMA: Status: ACTIVE | Noted: 2020-04-13

## 2020-04-13 PROBLEM — J45.909 ASTHMA (HCC): Status: ACTIVE | Noted: 2020-04-13

## 2020-04-13 PROCEDURE — 99213 OFFICE O/P EST LOW 20 MIN: CPT | Performed by: FAMILY MEDICINE

## 2020-04-13 RX ORDER — DOXYCYCLINE HYCLATE 100 MG
100 TABLET ORAL 2 TIMES DAILY
Qty: 20 TABLET | Refills: 0 | Status: SHIPPED | OUTPATIENT
Start: 2020-04-13 | End: 2020-04-23

## 2020-04-13 NOTE — PROGRESS NOTES
Virtual Telephone Check-In    Micheline Hernandez verbally consents to a Virtual/Telephone Check-In visit on 04/13/20. HPI:   Micheline Hernandez is a 38year old male who presents for upper respiratory symptoms for  2  weeks.  Patient reports sore throat, congestio Smoker      Smokeless tobacco: Former User    Alcohol use:  Yes      Alcohol/week: 7.0 - 14.0 standard drinks      Types: 7 - 14 Standard drinks or equivalent per week    Drug use: No        REVIEW OF SYSTEMS:   GENERAL: feels well otherwise  SKIN: no rashe

## 2020-05-26 RX ORDER — ATORVASTATIN CALCIUM 20 MG/1
TABLET, FILM COATED ORAL
Qty: 90 TABLET | Refills: 0 | Status: SHIPPED | OUTPATIENT
Start: 2020-05-26 | End: 2020-08-24

## 2020-07-18 ENCOUNTER — PATIENT MESSAGE (OUTPATIENT)
Dept: FAMILY MEDICINE CLINIC | Facility: CLINIC | Age: 45
End: 2020-07-18

## 2020-07-18 ENCOUNTER — TELEPHONE (OUTPATIENT)
Dept: FAMILY MEDICINE CLINIC | Facility: CLINIC | Age: 45
End: 2020-07-18

## 2020-07-18 NOTE — TELEPHONE ENCOUNTER
From: Tyrese Tran  To: Mirela Reid DO  Sent: 7/18/2020 11:17 AM CDT  Subject: Non-Urgent Medical Question    Poison ivy on hand and forearm.

## 2020-07-18 NOTE — TELEPHONE ENCOUNTER
Pt's wife called she thinks he has poison ivy, he was working on [employer] in his yard, it has tons of plants and weeds. It is all over his fingers. Pt is wanting to know if  could send something in for him?     Pt would like sent to  [facility]

## 2020-08-24 RX ORDER — ATORVASTATIN CALCIUM 20 MG/1
TABLET, FILM COATED ORAL
Qty: 90 TABLET | Refills: 0 | Status: SHIPPED | OUTPATIENT
Start: 2020-08-24 | End: 2020-11-23

## 2020-09-03 ENCOUNTER — TELEPHONE (OUTPATIENT)
Dept: FAMILY MEDICINE CLINIC | Facility: CLINIC | Age: 45
End: 2020-09-03

## 2020-09-21 ENCOUNTER — TELEPHONE (OUTPATIENT)
Dept: OTOLARYNGOLOGY | Age: 45
End: 2020-09-21

## 2020-09-25 ENCOUNTER — TELEPHONE (OUTPATIENT)
Dept: FAMILY MEDICINE CLINIC | Facility: CLINIC | Age: 45
End: 2020-09-25

## 2020-09-25 DIAGNOSIS — E78.5 HYPERLIPIDEMIA, UNSPECIFIED HYPERLIPIDEMIA TYPE: Primary | ICD-10-CM

## 2020-09-25 NOTE — TELEPHONE ENCOUNTER
LVM for pt regarding DS result note and recommendations. Ok per verbal release form. Office number provided if pt has any further questions or to schedule appt.

## 2020-09-25 NOTE — TELEPHONE ENCOUNTER
----- Message from Mirela Reid DO sent at 9/25/2020  8:08 AM CDT -----  Can notify Anthony Becca his LIPIDS look great, lets keep everything the same and keep up the good work, recall LIPIDS and CMP in 6 months

## 2020-09-29 ENCOUNTER — IMMUNIZATION (OUTPATIENT)
Dept: FAMILY MEDICINE CLINIC | Facility: CLINIC | Age: 45
End: 2020-09-29
Payer: COMMERCIAL

## 2020-09-29 DIAGNOSIS — Z23 NEED FOR VACCINATION: ICD-10-CM

## 2020-09-29 PROCEDURE — 90471 IMMUNIZATION ADMIN: CPT | Performed by: FAMILY MEDICINE

## 2020-09-29 PROCEDURE — 90686 IIV4 VACC NO PRSV 0.5 ML IM: CPT | Performed by: FAMILY MEDICINE

## 2020-11-02 ENCOUNTER — APPOINTMENT (OUTPATIENT)
Dept: OTOLARYNGOLOGY | Age: 45
End: 2020-11-02

## 2020-11-12 RX ORDER — BUDESONIDE AND FORMOTEROL FUMARATE DIHYDRATE 160; 4.5 UG/1; UG/1
2 AEROSOL RESPIRATORY (INHALATION) 2 TIMES DAILY
Qty: 1 INHALER | Refills: 5 | Status: SHIPPED | OUTPATIENT
Start: 2020-11-12 | End: 2020-12-12

## 2020-11-12 NOTE — TELEPHONE ENCOUNTER
Asthma & COPD Medication Protocol Acbqen3011/12/2020 09:28 AM   Asthma Action Score greater than or equal to 20    Appointment in past 6 or next 3 months     AAP/ACT given in last 12 months     No future appointments scheduled

## 2020-11-23 RX ORDER — ATORVASTATIN CALCIUM 20 MG/1
TABLET, FILM COATED ORAL
Qty: 90 TABLET | Refills: 2 | Status: SHIPPED | OUTPATIENT
Start: 2020-11-23 | End: 2021-08-20

## 2020-11-23 RX ORDER — ATORVASTATIN CALCIUM 20 MG/1
TABLET, FILM COATED ORAL
Qty: 90 TABLET | Refills: 2 | OUTPATIENT
Start: 2020-11-23 | End: 2021-02-21

## 2020-11-23 NOTE — TELEPHONE ENCOUNTER
Pt failed refill protocol for the following reasons:    Cholesterol Medication Protocol Uhxgfj1211/22/2020 11:50 AM   Appointment within past 12 or next 3 months           Last refill: 8- #90 with 0 refills  Last appt: 4- Virtual visit  Next ap

## 2020-11-23 NOTE — TELEPHONE ENCOUNTER
Cholesterol Medication Protocol [id number]/23/2020 09:29 AM   Appointment within past 12 or next 3 months Protocol Details    ALT < 80     ALT resulted within past year     Lipid panel within past 12 months      LOV: 4/13/20 Telemed   Last Refill:

## 2020-12-08 ENCOUNTER — OFFICE VISIT (OUTPATIENT)
Dept: AUDIOLOGY | Age: 45
End: 2020-12-08
Attending: PHYSICIAN ASSISTANT

## 2020-12-08 ENCOUNTER — OFFICE VISIT (OUTPATIENT)
Dept: OTOLARYNGOLOGY | Age: 45
End: 2020-12-08

## 2020-12-08 VITALS — TEMPERATURE: 97.1 F | HEART RATE: 70 BPM | OXYGEN SATURATION: 99 %

## 2020-12-08 DIAGNOSIS — H90.3 SENSORINEURAL HEARING LOSS (SNHL) OF BOTH EARS: Primary | ICD-10-CM

## 2020-12-08 DIAGNOSIS — H93.13 TINNITUS OF BOTH EARS: ICD-10-CM

## 2020-12-08 PROCEDURE — 92504 EAR MICROSCOPY EXAMINATION: CPT | Performed by: PHYSICIAN ASSISTANT

## 2020-12-08 PROCEDURE — 99213 OFFICE O/P EST LOW 20 MIN: CPT | Performed by: PHYSICIAN ASSISTANT

## 2020-12-08 PROCEDURE — 92552 PURE TONE AUDIOMETRY AIR: CPT | Performed by: AUDIOLOGIST

## 2020-12-08 PROCEDURE — 92556 SPEECH AUDIOMETRY COMPLETE: CPT | Performed by: AUDIOLOGIST

## 2020-12-08 RX ORDER — ATORVASTATIN CALCIUM 20 MG/1
TABLET, FILM COATED ORAL
COMMUNITY
Start: 2020-11-23

## 2020-12-08 RX ORDER — BUDESONIDE AND FORMOTEROL FUMARATE DIHYDRATE 160; 4.5 UG/1; UG/1
AEROSOL RESPIRATORY (INHALATION)
COMMUNITY
Start: 2020-11-13

## 2021-01-06 ENCOUNTER — MED REC SCAN ONLY (OUTPATIENT)
Dept: FAMILY MEDICINE CLINIC | Facility: CLINIC | Age: 46
End: 2021-01-06

## 2021-08-20 RX ORDER — ATORVASTATIN CALCIUM 20 MG/1
TABLET, FILM COATED ORAL
Qty: 90 TABLET | Refills: 2 | Status: SHIPPED | OUTPATIENT
Start: 2021-08-20 | End: 2021-11-18

## 2021-08-20 NOTE — TELEPHONE ENCOUNTER
Patient notified via detailed voicemail left at cell number (ok per  HIPAA consent)  Asked patient to call back to schedule appt.

## 2021-08-20 NOTE — TELEPHONE ENCOUNTER
LOV 4/13/20 virtual    LAST LAB 9/24/20, was due to recheck 3/21    LAST RX 11/23/20 x 9 months    Next OV No future appointments.       PROTOCOL  Advised, he's overdue for labs, orders faxed to [facility] per his request.

## 2021-08-31 ENCOUNTER — TELEPHONE (OUTPATIENT)
Dept: FAMILY MEDICINE CLINIC | Facility: CLINIC | Age: 46
End: 2021-08-31

## 2021-08-31 NOTE — TELEPHONE ENCOUNTER
Pt was advised of results- verbalized understanding    Per DS notes all labs look good- recheck in 1 year    Pt requested copy- it is at  to be picked up    Copy sent to scanning

## 2021-08-31 NOTE — TELEPHONE ENCOUNTER
SPOUSE CALLED AND ADV THAT PT HAD BLOOD WORK DONE OVER AT [facility]   LITTLE OVER A WEEK. PT LOOKING FOR TEST RESULTS.     PLEASE ADV    THANK YOU

## 2021-09-21 ENCOUNTER — OFFICE VISIT (OUTPATIENT)
Dept: SURGERY | Facility: CLINIC | Age: 46
End: 2021-09-21
Payer: COMMERCIAL

## 2021-09-21 VITALS — BODY MASS INDEX: 35.49 KG/M2 | WEIGHT: 262 LBS | HEIGHT: 72 IN | TEMPERATURE: 98 F | HEART RATE: 71 BPM

## 2021-09-21 DIAGNOSIS — K21.9 GASTROESOPHAGEAL REFLUX DISEASE, UNSPECIFIED WHETHER ESOPHAGITIS PRESENT: Primary | ICD-10-CM

## 2021-09-21 DIAGNOSIS — Z12.11 ENCOUNTER FOR SCREENING COLONOSCOPY: ICD-10-CM

## 2021-09-21 PROCEDURE — 3008F BODY MASS INDEX DOCD: CPT | Performed by: SURGERY

## 2021-09-21 PROCEDURE — 99204 OFFICE O/P NEW MOD 45 MIN: CPT | Performed by: SURGERY

## 2021-09-21 RX ORDER — SODIUM, POTASSIUM,MAG SULFATES 17.5-3.13G
SOLUTION, RECONSTITUTED, ORAL ORAL
Qty: 1 EACH | Refills: 0 | Status: SHIPPED | OUTPATIENT
Start: 2021-09-21

## 2021-09-21 NOTE — H&P
Maggy Moran is a 40year old male  Patient presents with:  Colonoscopy: patient here for colonoscopy consult. last colonoscopy done 2016. hx colon polyps. c/o rectal bleeding with bowel movement.        REFERRED BY    Patient presents with  Personal hist Current User    Alcohol use:  Yes      Alcohol/week: 7.0 - 14.0 standard drinks      Types: 7 - 14 Standard drinks or equivalent per week    Drug use: No      ROS     GENERAL HEALTH: otherwise feels well, no weight loss, no fever or chills  SKIN: denies any <150 mg/dL Final   • LDL-CHOLESTEROL 09/24/2020 94  mg/dL (calc) Final    Comment: Reference range: <100     Desirable range <100 mg/dL for primary prevention;    <70 mg/dL for patients with CHD or diabetic patients   with > or = 2 CHD risk factors.      LD

## 2021-09-22 ENCOUNTER — TELEPHONE (OUTPATIENT)
Dept: SURGERY | Facility: CLINIC | Age: 46
End: 2021-09-22

## 2021-09-22 DIAGNOSIS — K21.9 GASTROESOPHAGEAL REFLUX DISEASE WITHOUT ESOPHAGITIS: ICD-10-CM

## 2021-09-22 DIAGNOSIS — Z12.11 SPECIAL SCREENING FOR MALIGNANT NEOPLASMS, COLON: Primary | ICD-10-CM

## 2021-09-27 ENCOUNTER — TELEPHONE (OUTPATIENT)
Dept: SURGERY | Facility: CLINIC | Age: 46
End: 2021-09-27

## 2021-09-27 DIAGNOSIS — Z12.11 SPECIAL SCREENING FOR MALIGNANT NEOPLASMS, COLON: Primary | ICD-10-CM

## 2021-10-29 ENCOUNTER — LAB ENCOUNTER (OUTPATIENT)
Dept: LAB | Age: 46
End: 2021-10-29
Attending: SURGERY
Payer: COMMERCIAL

## 2021-10-29 DIAGNOSIS — K21.9 GASTROESOPHAGEAL REFLUX DISEASE, UNSPECIFIED WHETHER ESOPHAGITIS PRESENT: ICD-10-CM

## 2021-11-01 ENCOUNTER — ANESTHESIA EVENT (OUTPATIENT)
Dept: ENDOSCOPY | Facility: HOSPITAL | Age: 46
End: 2021-11-01
Payer: COMMERCIAL

## 2021-11-01 ENCOUNTER — ANESTHESIA (OUTPATIENT)
Dept: ENDOSCOPY | Facility: HOSPITAL | Age: 46
End: 2021-11-01
Payer: COMMERCIAL

## 2021-11-01 ENCOUNTER — HOSPITAL ENCOUNTER (OUTPATIENT)
Facility: HOSPITAL | Age: 46
Setting detail: HOSPITAL OUTPATIENT SURGERY
Discharge: HOME | End: 2021-11-01
Attending: SURGERY | Admitting: SURGERY
Payer: COMMERCIAL

## 2021-11-01 VITALS
TEMPERATURE: 98 F | HEIGHT: 72 IN | HEART RATE: 60 BPM | WEIGHT: 255 LBS | DIASTOLIC BLOOD PRESSURE: 81 MMHG | OXYGEN SATURATION: 100 % | BODY MASS INDEX: 34.54 KG/M2 | RESPIRATION RATE: 20 BRPM | SYSTOLIC BLOOD PRESSURE: 131 MMHG

## 2021-11-01 DIAGNOSIS — K21.9 GASTROESOPHAGEAL REFLUX DISEASE, UNSPECIFIED WHETHER ESOPHAGITIS PRESENT: Primary | ICD-10-CM

## 2021-11-01 DIAGNOSIS — Z12.11 ENCOUNTER FOR SCREENING COLONOSCOPY: ICD-10-CM

## 2021-11-01 PROCEDURE — 0DBM8ZX EXCISION OF DESCENDING COLON, VIA NATURAL OR ARTIFICIAL OPENING ENDOSCOPIC, DIAGNOSTIC: ICD-10-PCS | Performed by: SURGERY

## 2021-11-01 PROCEDURE — 88305 TISSUE EXAM BY PATHOLOGIST: CPT | Performed by: SURGERY

## 2021-11-01 PROCEDURE — 0DB38ZX EXCISION OF LOWER ESOPHAGUS, VIA NATURAL OR ARTIFICIAL OPENING ENDOSCOPIC, DIAGNOSTIC: ICD-10-PCS | Performed by: SURGERY

## 2021-11-01 RX ORDER — LIDOCAINE HYDROCHLORIDE 10 MG/ML
INJECTION, SOLUTION EPIDURAL; INFILTRATION; INTRACAUDAL; PERINEURAL AS NEEDED
Status: DISCONTINUED | OUTPATIENT
Start: 2021-11-01 | End: 2021-11-01 | Stop reason: SURG

## 2021-11-01 RX ORDER — SODIUM CHLORIDE, SODIUM LACTATE, POTASSIUM CHLORIDE, CALCIUM CHLORIDE 600; 310; 30; 20 MG/100ML; MG/100ML; MG/100ML; MG/100ML
INJECTION, SOLUTION INTRAVENOUS CONTINUOUS
Status: DISCONTINUED | OUTPATIENT
Start: 2021-11-01 | End: 2021-11-01

## 2021-11-01 RX ORDER — NALOXONE HYDROCHLORIDE 0.4 MG/ML
80 INJECTION, SOLUTION INTRAMUSCULAR; INTRAVENOUS; SUBCUTANEOUS AS NEEDED
Status: DISCONTINUED | OUTPATIENT
Start: 2021-11-01 | End: 2021-11-01

## 2021-11-01 RX ORDER — ONDANSETRON 2 MG/ML
4 INJECTION INTRAMUSCULAR; INTRAVENOUS AS NEEDED
Status: DISCONTINUED | OUTPATIENT
Start: 2021-11-01 | End: 2021-11-01

## 2021-11-01 RX ADMIN — SODIUM CHLORIDE, SODIUM LACTATE, POTASSIUM CHLORIDE, CALCIUM CHLORIDE: 600; 310; 30; 20 INJECTION, SOLUTION INTRAVENOUS at 13:02:00

## 2021-11-01 RX ADMIN — LIDOCAINE HYDROCHLORIDE 120 MG: 10 INJECTION, SOLUTION EPIDURAL; INFILTRATION; INTRACAUDAL; PERINEURAL at 13:05:00

## 2021-11-01 NOTE — H&P
Patient presents with  Personal history of colon polyps  Pt had colonoscopy by me in 2016 with removal of polyp  Had bleeding a few weeks ago that resolved   In a day  Pt has father with h/o of colon cancer   Pt states he uses chewing tobacco and uses it d otherwise feels well, no weight loss, no fever or chills  SKIN: denies any unusual skin rashes or jaundice  HEENT: denies nasal congestion, sinus pain or sore throat; hearing loss negative,   EYES , no diplopia or vision changes  RESPIRATORY: denies shortn mg/dL for patients with CHD or diabetic patients   with > or = 2 CHD risk factors.      LDL-C is now calculated using the Austin-Murray   calculation, which is a validated novel method providing   better accuracy than the Friedewald equation in the   estim

## 2021-11-01 NOTE — OPERATIVE REPORT
[address]    PATIENT'S NAME: Atha Mcburney ANGEL   ATTENDING PHYSICIAN: Jackie Garcia D.O.   OPERATING PHYSICIAN: Jackie Garcia D.O.   PATIENT ACCOUNT#:   [id number]    LOCATION:  [place] [address] ED  MEDICAL RECORD #:   [id number]       YUAN to the Z-line. The scope was then slowly withdrawn through the remainder of the esophagus, demonstrating no evidence of mucosal disease, masses, or other lesions. The scope was then removed. Patient tolerated the procedure well.      Dictated By Gutierrez Jacome

## 2021-11-01 NOTE — ANESTHESIA PREPROCEDURE EVALUATION
PRE-OP EVALUATION    Patient Name: Aldair Gunn    Admit Diagnosis: Gastroesophageal reflux disease, unspecified whether esophagitis present [K21.9]  Encounter for screening colonoscopy [Z12.11]    Pre-op Diagnosis: Gastroesophageal reflux disease, uns hyperlipidemia                                  Endo/Other    Negative endo/other ROS.                               Pulmonary  Comment: Chews tobacco    (+) asthma (no hospitalizations, rare rescue inhaler use)           (-) recent URI   (+) sleep apnea an

## 2021-11-01 NOTE — ANESTHESIA POSTPROCEDURE EVALUATION
[address] [place] [address] Patient Status:  [facility]   Age/Gender 40year old male MRN [id number]   Location [id number] [place] [address] Attending Mely Sam DO   Hosp Day # 0 PCP Will Abel DO       Anesthesia

## 2021-11-02 NOTE — OPERATIVE REPORT
[facility]    PATIENT'S NAME: Svetlana Pérez ANGEL   ATTENDING PHYSICIAN: Shantell Kelly D.O.   OPERATING PHYSICIAN: Shantell Kelly D.O.   PATIENT ACCOUNT#:   [id number]    LOCATION:  [facility] ENDO POOL ROOMS 2 EDWP  MEDICAL RECORD #:   [id number]       YUAN 18:27:29  [facility] [id number]  RL/    cc: Demetrio Mccauley D.O.

## 2021-11-10 ENCOUNTER — TELEPHONE (OUTPATIENT)
Dept: SURGERY | Facility: CLINIC | Age: 46
End: 2021-11-10

## 2021-11-10 NOTE — TELEPHONE ENCOUNTER
Placed colonoscopy recall in patient outreach for 3 years--11/1/2024 per Dr. Angelina Beckwith. [facility] updated for colonoscopy.

## 2021-11-10 NOTE — TELEPHONE ENCOUNTER
----- Message from Maryjane Carter DO sent at 11/8/2021  3:47 PM CST -----  I called results to patient.   Please put him in recall for colonoscopy in 3 years

## 2021-12-27 ENCOUNTER — TELEPHONE (OUTPATIENT)
Dept: FAMILY MEDICINE CLINIC | Facility: CLINIC | Age: 46
End: 2021-12-27

## 2021-12-27 DIAGNOSIS — Z20.822 SUSPECTED COVID-19 VIRUS INFECTION: Primary | ICD-10-CM

## 2021-12-27 NOTE — TELEPHONE ENCOUNTER
Pt states he took home test today and it was positive- pt started sxs started Saturday night    Chest tightness, runny nose, congestion and no taste or smell this morning at breakfast.    Pt states he would like a PCR test    RN placed order and provided h

## 2021-12-29 ENCOUNTER — LAB ENCOUNTER (OUTPATIENT)
Dept: LAB | Age: 46
End: 2021-12-29
Attending: FAMILY MEDICINE
Payer: COMMERCIAL

## 2021-12-29 DIAGNOSIS — Z20.822 SUSPECTED COVID-19 VIRUS INFECTION: ICD-10-CM

## 2022-01-01 LAB — SARS-COV-2 RNA RESP QL NAA+PROBE: DETECTED

## 2022-02-02 ENCOUNTER — TELEPHONE (OUTPATIENT)
Dept: DERMATOLOGY | Age: 47
End: 2022-02-02

## 2022-02-03 ENCOUNTER — OFFICE VISIT (OUTPATIENT)
Dept: DERMATOLOGY | Age: 47
End: 2022-02-03

## 2022-02-03 ENCOUNTER — APPOINTMENT (OUTPATIENT)
Dept: DERMATOLOGY | Age: 47
End: 2022-02-03

## 2022-02-03 DIAGNOSIS — D23.9 DERMATOFIBROMA OF MULTIPLE SITES: Primary | ICD-10-CM

## 2022-02-03 DIAGNOSIS — L91.8 INFLAMED SKIN TAG: ICD-10-CM

## 2022-02-03 DIAGNOSIS — D22.9 MULTIPLE NEVI: ICD-10-CM

## 2022-02-03 PROCEDURE — 11200 RMVL SKIN TAGS UP TO&INC 15: CPT | Performed by: DERMATOLOGY

## 2022-02-03 PROCEDURE — 99203 OFFICE O/P NEW LOW 30 MIN: CPT | Performed by: DERMATOLOGY

## 2022-02-03 RX ORDER — CETIRIZINE HYDROCHLORIDE 10 MG/1
10 TABLET ORAL DAILY
COMMUNITY

## 2022-05-31 RX ORDER — BUDESONIDE AND FORMOTEROL FUMARATE DIHYDRATE 160; 4.5 UG/1; UG/1
2 AEROSOL RESPIRATORY (INHALATION) DAILY
Qty: 10.2 G | Refills: 3 | Status: SHIPPED | OUTPATIENT
Start: 2022-05-31

## 2022-05-31 RX ORDER — ATORVASTATIN CALCIUM 20 MG/1
TABLET, FILM COATED ORAL
Qty: 90 TABLET | Refills: 2 | Status: SHIPPED | OUTPATIENT
Start: 2022-05-31 | End: 2022-08-29

## 2022-05-31 NOTE — TELEPHONE ENCOUNTER
Atorvastatin last refilled 8/20/21 for #90 with 2 RF  LV with DS phone visit 4/13/20  No future appt with pcp  Last Lipid 9/24/20  Last ALT 3/19/20    Cholesterol Medication Protocol Failed 05/29/2022 07:41 AM   Protocol Details  ALT < 80    ALT resulted within past year    Lipid panel within past 12 months    Appointment within past 12 or next 3 months     Symbicort last refilled 11/12/20 for 1 inhaler with 5 RF     Asthma & COPD Medication Protocol Failed 05/29/2022 07:41 AM    Asthma Action Score greater than or equal to 20    Appointment in past 6 or next 3 months     AAP/ACT given in last 12 months

## 2022-07-15 ENCOUNTER — PATIENT MESSAGE (OUTPATIENT)
Dept: FAMILY MEDICINE CLINIC | Facility: CLINIC | Age: 47
End: 2022-07-15

## 2022-07-15 NOTE — TELEPHONE ENCOUNTER
He needs to go to the [facility]      Patient advised of the information per Dr. Martine Chen.

## 2022-09-08 ENCOUNTER — MED REC SCAN ONLY (OUTPATIENT)
Dept: FAMILY MEDICINE CLINIC | Facility: CLINIC | Age: 47
End: 2022-09-08

## 2022-10-04 ENCOUNTER — HOSPITAL ENCOUNTER (OUTPATIENT)
Dept: CT IMAGING | Age: 47
Discharge: HOME | End: 2022-10-04
Attending: INTERNAL MEDICINE

## 2022-10-04 VITALS — HEIGHT: 72 IN | WEIGHT: 255 LBS | BODY MASS INDEX: 34.54 KG/M2

## 2022-10-04 DIAGNOSIS — Z13.6 SCREENING FOR CARDIOVASCULAR CONDITION: ICD-10-CM

## 2022-10-17 ENCOUNTER — OFFICE VISIT (OUTPATIENT)
Dept: FAMILY MEDICINE CLINIC | Facility: CLINIC | Age: 47
End: 2022-10-17
Payer: COMMERCIAL

## 2022-10-17 VITALS
TEMPERATURE: 97 F | HEART RATE: 92 BPM | SYSTOLIC BLOOD PRESSURE: 142 MMHG | DIASTOLIC BLOOD PRESSURE: 84 MMHG | WEIGHT: 270.19 LBS | OXYGEN SATURATION: 98 % | HEIGHT: 72 IN | BODY MASS INDEX: 36.6 KG/M2

## 2022-10-17 DIAGNOSIS — Z00.00 ROUTINE GENERAL MEDICAL EXAMINATION AT A HEALTH CARE FACILITY: Primary | ICD-10-CM

## 2022-10-17 LAB
ALBUMIN SERPL-MCNC: 4 G/DL (ref 3.4–5)
ALBUMIN/GLOB SERPL: 1.2 {RATIO} (ref 1–2)
ALP LIVER SERPL-CCNC: 75 U/L
ALT SERPL-CCNC: 54 U/L
ANION GAP SERPL CALC-SCNC: 6 MMOL/L (ref 0–18)
AST SERPL-CCNC: 22 U/L (ref 15–37)
BASOPHILS # BLD AUTO: 0.05 X10(3) UL (ref 0–0.2)
BASOPHILS NFR BLD AUTO: 0.7 %
BILIRUB SERPL-MCNC: 1.5 MG/DL (ref 0.1–2)
BUN BLD-MCNC: 20 MG/DL (ref 7–18)
CALCIUM BLD-MCNC: 9 MG/DL (ref 8.5–10.1)
CHLORIDE SERPL-SCNC: 109 MMOL/L (ref 98–112)
CHOLEST SERPL-MCNC: 190 MG/DL (ref ?–200)
CO2 SERPL-SCNC: 26 MMOL/L (ref 21–32)
CREAT BLD-MCNC: 1.04 MG/DL
EOSINOPHIL # BLD AUTO: 0.13 X10(3) UL (ref 0–0.7)
EOSINOPHIL NFR BLD AUTO: 1.7 %
ERYTHROCYTE [DISTWIDTH] IN BLOOD BY AUTOMATED COUNT: 12.4 %
FASTING PATIENT LIPID ANSWER: YES
FASTING STATUS PATIENT QL REPORTED: YES
GFR SERPLBLD BASED ON 1.73 SQ M-ARVRAT: 90 ML/MIN/1.73M2 (ref 60–?)
GLOBULIN PLAS-MCNC: 3.4 G/DL (ref 2.8–4.4)
GLUCOSE BLD-MCNC: 99 MG/DL (ref 70–99)
HCT VFR BLD AUTO: 46.5 %
HDLC SERPL-MCNC: 50 MG/DL (ref 40–59)
HGB BLD-MCNC: 15.2 G/DL
IMM GRANULOCYTES # BLD AUTO: 0.04 X10(3) UL (ref 0–1)
IMM GRANULOCYTES NFR BLD: 0.5 %
LDLC SERPL CALC-MCNC: 114 MG/DL (ref ?–100)
LYMPHOCYTES # BLD AUTO: 0.45 X10(3) UL (ref 1–4)
LYMPHOCYTES NFR BLD AUTO: 5.9 %
MCH RBC QN AUTO: 28.1 PG (ref 26–34)
MCHC RBC AUTO-ENTMCNC: 32.7 G/DL (ref 31–37)
MCV RBC AUTO: 86.1 FL
MONOCYTES # BLD AUTO: 0.61 X10(3) UL (ref 0.1–1)
MONOCYTES NFR BLD AUTO: 8 %
NEUTROPHILS # BLD AUTO: 6.38 X10 (3) UL (ref 1.5–7.7)
NEUTROPHILS # BLD AUTO: 6.38 X10(3) UL (ref 1.5–7.7)
NEUTROPHILS NFR BLD AUTO: 83.2 %
NONHDLC SERPL-MCNC: 140 MG/DL (ref ?–130)
OSMOLALITY SERPL CALC.SUM OF ELEC: 295 MOSM/KG (ref 275–295)
PLATELET # BLD AUTO: 196 10(3)UL (ref 150–450)
POTASSIUM SERPL-SCNC: 3.6 MMOL/L (ref 3.5–5.1)
PROT SERPL-MCNC: 7.4 G/DL (ref 6.4–8.2)
RBC # BLD AUTO: 5.4 X10(6)UL
SODIUM SERPL-SCNC: 141 MMOL/L (ref 136–145)
T4 FREE SERPL-MCNC: 0.9 NG/DL (ref 0.8–1.7)
TRIGL SERPL-MCNC: 145 MG/DL (ref 30–149)
TSI SER-ACNC: 1.59 MIU/ML (ref 0.36–3.74)
VLDLC SERPL CALC-MCNC: 25 MG/DL (ref 0–30)
WBC # BLD AUTO: 7.7 X10(3) UL (ref 4–11)

## 2022-10-17 PROCEDURE — 80050 GENERAL HEALTH PANEL: CPT | Performed by: FAMILY MEDICINE

## 2022-10-17 PROCEDURE — 80061 LIPID PANEL: CPT | Performed by: FAMILY MEDICINE

## 2022-10-17 PROCEDURE — 84439 ASSAY OF FREE THYROXINE: CPT | Performed by: FAMILY MEDICINE

## 2022-10-17 PROCEDURE — 83036 HEMOGLOBIN GLYCOSYLATED A1C: CPT | Performed by: FAMILY MEDICINE

## 2022-10-18 ENCOUNTER — TELEPHONE (OUTPATIENT)
Dept: FAMILY MEDICINE CLINIC | Facility: CLINIC | Age: 47
End: 2022-10-18

## 2022-10-18 DIAGNOSIS — R73.09 ELEVATED GLUCOSE: Primary | ICD-10-CM

## 2022-10-18 DIAGNOSIS — E74.39 GLUCOSE INTOLERANCE: ICD-10-CM

## 2022-10-18 LAB
EST. AVERAGE GLUCOSE BLD GHB EST-MCNC: 128 MG/DL (ref 68–126)
HBA1C MFR BLD: 6.1 % (ref ?–5.7)

## 2022-10-18 NOTE — TELEPHONE ENCOUNTER
----- Message from Alex Monday, DO sent at 10/18/2022  1:58 PM CDT -----  Can notify Olamide Crane that overall his labs looked  his thyroid blood count kidney, and liver fiuncion looked good, his total Cholesterol was not bad but his bad cholesterol could use some work. Also his BS was normal, but his long term BS control is up to 6.1, which puts him in the prediabetic range. The good news the fix for this is easy, don't overeat. Watch your carbs get some exercise and lose about 10-15 pounds (to Start) and we don;t have to do anything additionally. If his number don't improve after 3 months , then I'll have to start him on diabetes meds. Does he think seeing a nutritionist might help?  If so we can place a referral

## 2022-10-18 NOTE — TELEPHONE ENCOUNTER
Spoke with patient, aware of results and recommendations. Patient would like referral placed for dietician.     Referral pended

## 2022-10-21 ENCOUNTER — OFFICE VISIT (OUTPATIENT)
Dept: OTOLARYNGOLOGY | Age: 47
End: 2022-10-21

## 2022-10-21 ENCOUNTER — OFFICE VISIT (OUTPATIENT)
Dept: AUDIOLOGY | Age: 47
End: 2022-10-21
Attending: PHYSICIAN ASSISTANT

## 2022-10-21 DIAGNOSIS — H90.A21 SENSORINEURAL HEARING LOSS (SNHL) OF RIGHT EAR WITH RESTRICTED HEARING OF LEFT EAR: ICD-10-CM

## 2022-10-21 DIAGNOSIS — H90.A32 MIXED CONDUCTIVE AND SENSORINEURAL HEARING LOSS OF LEFT EAR WITH RESTRICTED HEARING OF RIGHT EAR: ICD-10-CM

## 2022-10-21 DIAGNOSIS — H69.93 DYSFUNCTION OF BOTH EUSTACHIAN TUBES: ICD-10-CM

## 2022-10-21 DIAGNOSIS — H90.3 ASYMMETRICAL SENSORINEURAL HEARING LOSS: Primary | ICD-10-CM

## 2022-10-21 DIAGNOSIS — H90.3 SENSORINEURAL HEARING LOSS (SNHL) OF BOTH EARS: ICD-10-CM

## 2022-10-21 DIAGNOSIS — H69.92 DYSFUNCTION OF LEFT EUSTACHIAN TUBE: Primary | ICD-10-CM

## 2022-10-21 PROCEDURE — 92557 COMPREHENSIVE HEARING TEST: CPT | Performed by: AUDIOLOGIST

## 2022-10-21 PROCEDURE — 99214 OFFICE O/P EST MOD 30 MIN: CPT | Performed by: PHYSICIAN ASSISTANT

## 2022-10-21 PROCEDURE — 92567 TYMPANOMETRY: CPT | Performed by: AUDIOLOGIST

## 2022-10-21 RX ORDER — PREDNISONE 10 MG/1
TABLET ORAL
Qty: 22 TABLET | Refills: 0 | Status: SHIPPED | OUTPATIENT
Start: 2022-10-21 | End: 2022-10-29

## 2022-10-21 RX ORDER — FLUTICASONE PROPIONATE 50 MCG
2 SPRAY, SUSPENSION (ML) NASAL DAILY
Qty: 16 G | Refills: 0 | Status: SHIPPED | OUTPATIENT
Start: 2022-10-21 | End: 2022-11-20

## 2022-11-04 ENCOUNTER — OFFICE VISIT (OUTPATIENT)
Dept: AUDIOLOGY | Age: 47
End: 2022-11-04

## 2022-11-04 ENCOUNTER — OFFICE VISIT (OUTPATIENT)
Dept: OTOLARYNGOLOGY | Age: 47
End: 2022-11-04

## 2022-11-04 DIAGNOSIS — H90.A32 MIXED CONDUCTIVE AND SENSORINEURAL HEARING LOSS OF LEFT EAR WITH RESTRICTED HEARING OF RIGHT EAR: ICD-10-CM

## 2022-11-04 DIAGNOSIS — H90.A32 MIXED CONDUCTIVE AND SENSORINEURAL HEARING LOSS OF LEFT EAR WITH RESTRICTED HEARING OF RIGHT EAR: Primary | ICD-10-CM

## 2022-11-04 DIAGNOSIS — H69.92 DYSFUNCTION OF LEFT EUSTACHIAN TUBE: ICD-10-CM

## 2022-11-04 DIAGNOSIS — H90.3 ASYMMETRICAL SENSORINEURAL HEARING LOSS: Primary | ICD-10-CM

## 2022-11-04 PROCEDURE — 92567 TYMPANOMETRY: CPT | Performed by: AUDIOLOGIST

## 2022-11-04 PROCEDURE — 92504 EAR MICROSCOPY EXAMINATION: CPT | Performed by: PHYSICIAN ASSISTANT

## 2022-11-04 PROCEDURE — 92557 COMPREHENSIVE HEARING TEST: CPT | Performed by: AUDIOLOGIST

## 2022-11-04 PROCEDURE — 99213 OFFICE O/P EST LOW 20 MIN: CPT | Performed by: PHYSICIAN ASSISTANT

## 2023-01-19 ENCOUNTER — OFFICE VISIT (OUTPATIENT)
Dept: DERMATOLOGY | Age: 48
End: 2023-01-19

## 2023-01-19 DIAGNOSIS — Z12.83 SKIN CANCER SCREENING: ICD-10-CM

## 2023-01-19 DIAGNOSIS — D23.9 DERMATOFIBROMA OF MULTIPLE SITES: ICD-10-CM

## 2023-01-19 DIAGNOSIS — L82.0 INFLAMED SEBORRHEIC KERATOSIS: Primary | ICD-10-CM

## 2023-01-19 PROCEDURE — 17110 DESTRUCTION B9 LES UP TO 14: CPT | Performed by: DERMATOLOGY

## 2023-01-19 PROCEDURE — 99213 OFFICE O/P EST LOW 20 MIN: CPT | Performed by: DERMATOLOGY

## 2023-01-20 ENCOUNTER — TELEPHONE (OUTPATIENT)
Dept: FAMILY MEDICINE CLINIC | Facility: CLINIC | Age: 48
End: 2023-01-20

## 2023-01-20 DIAGNOSIS — R73.09 ELEVATED GLUCOSE: Primary | ICD-10-CM

## 2023-01-20 NOTE — TELEPHONE ENCOUNTER
Letter mailed to patient reminding him he is due for labs per pt reminder.     Lab Frequency Next Occurrence   HEMOGLOBIN A1C Once 01/20/2023     Mirella Jacob Daniel Nurse  Recall A1c in 3 months

## 2023-02-03 ENCOUNTER — OFFICE VISIT (OUTPATIENT)
Dept: OTOLARYNGOLOGY | Age: 48
End: 2023-02-03

## 2023-02-03 ENCOUNTER — OFFICE VISIT (OUTPATIENT)
Dept: AUDIOLOGY | Age: 48
End: 2023-02-03

## 2023-02-03 DIAGNOSIS — H90.A32 MIXED CONDUCTIVE AND SENSORINEURAL HEARING LOSS OF LEFT EAR WITH RESTRICTED HEARING OF RIGHT EAR: Primary | ICD-10-CM

## 2023-02-03 DIAGNOSIS — H93.13 TINNITUS OF BOTH EARS: ICD-10-CM

## 2023-02-03 DIAGNOSIS — H90.3 ASYMMETRICAL SENSORINEURAL HEARING LOSS: Primary | ICD-10-CM

## 2023-02-03 PROCEDURE — 99213 OFFICE O/P EST LOW 20 MIN: CPT | Performed by: PHYSICIAN ASSISTANT

## 2023-02-03 PROCEDURE — 92557 COMPREHENSIVE HEARING TEST: CPT | Performed by: AUDIOLOGIST

## 2023-02-20 RX ORDER — ATORVASTATIN CALCIUM 20 MG/1
20 TABLET, FILM COATED ORAL DAILY
Qty: 90 TABLET | Refills: 2 | Status: SHIPPED | OUTPATIENT
Start: 2023-02-20 | End: 2023-05-21

## 2023-02-20 NOTE — TELEPHONE ENCOUNTER
LOV:  10/17/22   Last Refill: 5/31/22 90 2 RF      Pt sent [facility] request- he is in [place] until the end of March and needs refill send to pended pharmacy

## 2023-02-22 RX ORDER — ATORVASTATIN CALCIUM 20 MG/1
TABLET, FILM COATED ORAL
Qty: 90 TABLET | Refills: 2 | OUTPATIENT
Start: 2023-02-22 | End: 2023-05-23

## 2023-03-09 NOTE — TELEPHONE ENCOUNTER
Cholesterol Medication Protocol Passed5/26 10:57 AM   ALT < 80    ALT resulted within past year    Lipid panel within past 12 months    Appointment within past 12 or next 3 months     Refilled per protocol. Quality 402: Tobacco Use And Help With Quitting Among Adolescents: Patient screened for tobacco and never smoked Quality 111:Pneumonia Vaccination Status For Older Adults: Pneumococcal Vaccination Previously Received Quality 431: Preventive Care And Screening: Unhealthy Alcohol Use - Screening: Patient screened for unhealthy alcohol use using a single question and scores less than 2 times per year Detail Level: Detailed Quality 130: Documentation Of Current Medications In The Medical Record: Current Medications Documented Quality 226: Preventive Care And Screening: Tobacco Use: Screening And Cessation Intervention: Patient screened for tobacco use and is an ex/non-smoker

## 2023-03-21 ENCOUNTER — IMAGING SERVICES (OUTPATIENT)
Dept: MRI IMAGING | Age: 48
End: 2023-03-21
Attending: PHYSICIAN ASSISTANT

## 2023-03-21 DIAGNOSIS — H90.3 ASYMMETRICAL SENSORINEURAL HEARING LOSS: ICD-10-CM

## 2023-03-21 PROCEDURE — A9585 GADOBUTROL INJECTION: HCPCS | Performed by: RADIOLOGY

## 2023-03-21 PROCEDURE — 70553 MRI BRAIN STEM W/O & W/DYE: CPT | Performed by: RADIOLOGY

## 2023-03-21 RX ORDER — GADOBUTROL 604.72 MG/ML
10 INJECTION INTRAVENOUS ONCE
Status: COMPLETED | OUTPATIENT
Start: 2023-03-21 | End: 2023-03-21

## 2023-03-21 RX ADMIN — GADOBUTROL 10 ML: 604.72 INJECTION INTRAVENOUS at 11:58

## 2023-03-27 ENCOUNTER — TELEPHONE (OUTPATIENT)
Dept: OTOLARYNGOLOGY | Age: 48
End: 2023-03-27

## 2023-05-04 ENCOUNTER — LAB ENCOUNTER (OUTPATIENT)
Dept: LAB | Age: 48
End: 2023-05-04
Attending: INTERNAL MEDICINE
Payer: COMMERCIAL

## 2023-05-04 DIAGNOSIS — R73.09 ELEVATED GLUCOSE: ICD-10-CM

## 2023-05-04 DIAGNOSIS — E78.2 HYPERLIPIDEMIA, MIXED: Primary | ICD-10-CM

## 2023-05-04 LAB
ALBUMIN SERPL-MCNC: 4 G/DL (ref 3.4–5)
ALBUMIN/GLOB SERPL: 1.1 {RATIO} (ref 1–2)
ALP LIVER SERPL-CCNC: 81 U/L
ALT SERPL-CCNC: 64 U/L
ANION GAP SERPL CALC-SCNC: 4 MMOL/L (ref 0–18)
AST SERPL-CCNC: 35 U/L (ref 15–37)
BILIRUB SERPL-MCNC: 2 MG/DL (ref 0.1–2)
BUN BLD-MCNC: 24 MG/DL (ref 7–18)
CALCIUM BLD-MCNC: 9.8 MG/DL (ref 8.5–10.1)
CHLORIDE SERPL-SCNC: 107 MMOL/L (ref 98–112)
CHOLEST SERPL-MCNC: 194 MG/DL (ref ?–200)
CO2 SERPL-SCNC: 29 MMOL/L (ref 21–32)
CREAT BLD-MCNC: 1.07 MG/DL
FASTING PATIENT LIPID ANSWER: YES
FASTING STATUS PATIENT QL REPORTED: YES
GFR SERPLBLD BASED ON 1.73 SQ M-ARVRAT: 87 ML/MIN/1.73M2 (ref 60–?)
GLOBULIN PLAS-MCNC: 3.5 G/DL (ref 2.8–4.4)
GLUCOSE BLD-MCNC: 91 MG/DL (ref 70–99)
HDLC SERPL-MCNC: 53 MG/DL (ref 40–59)
LDLC SERPL CALC-MCNC: 122 MG/DL (ref ?–100)
NONHDLC SERPL-MCNC: 141 MG/DL (ref ?–130)
OSMOLALITY SERPL CALC.SUM OF ELEC: 294 MOSM/KG (ref 275–295)
POTASSIUM SERPL-SCNC: 4.6 MMOL/L (ref 3.5–5.1)
PROT SERPL-MCNC: 7.5 G/DL (ref 6.4–8.2)
SODIUM SERPL-SCNC: 140 MMOL/L (ref 136–145)
TRIGL SERPL-MCNC: 107 MG/DL (ref 30–149)
VLDLC SERPL CALC-MCNC: 19 MG/DL (ref 0–30)

## 2023-05-04 PROCEDURE — 80053 COMPREHEN METABOLIC PANEL: CPT

## 2023-05-04 PROCEDURE — 83036 HEMOGLOBIN GLYCOSYLATED A1C: CPT | Performed by: FAMILY MEDICINE

## 2023-05-04 PROCEDURE — 80061 LIPID PANEL: CPT

## 2023-05-04 PROCEDURE — 36415 COLL VENOUS BLD VENIPUNCTURE: CPT

## 2023-05-05 ENCOUNTER — MOBILE ENCOUNTER (OUTPATIENT)
Dept: FAMILY MEDICINE CLINIC | Facility: CLINIC | Age: 48
End: 2023-05-05

## 2023-05-05 ENCOUNTER — TELEPHONE (OUTPATIENT)
Dept: FAMILY MEDICINE CLINIC | Facility: CLINIC | Age: 48
End: 2023-05-05

## 2023-05-05 DIAGNOSIS — E78.2 MIXED HYPERLIPIDEMIA: Primary | ICD-10-CM

## 2023-05-05 DIAGNOSIS — E74.39 GLUCOSE INTOLERANCE: ICD-10-CM

## 2023-05-05 NOTE — TELEPHONE ENCOUNTER
Left message on voicemail/answering machine for patient to call office    mychart sent as well, hold to confirm read    Recall in epic

## 2023-05-16 ENCOUNTER — HOSPITAL ENCOUNTER (OUTPATIENT)
Age: 48
Discharge: HOME | End: 2023-05-16
Payer: COMMERCIAL

## 2023-05-16 VITALS
RESPIRATION RATE: 18 BRPM | OXYGEN SATURATION: 99 % | HEIGHT: 72 IN | SYSTOLIC BLOOD PRESSURE: 154 MMHG | TEMPERATURE: 97 F | BODY MASS INDEX: 34.54 KG/M2 | DIASTOLIC BLOOD PRESSURE: 93 MMHG | HEART RATE: 55 BPM | WEIGHT: 255 LBS

## 2023-05-16 DIAGNOSIS — L23.7 POISON IVY DERMATITIS: Primary | ICD-10-CM

## 2023-05-16 PROCEDURE — 99203 OFFICE O/P NEW LOW 30 MIN: CPT | Performed by: NURSE PRACTITIONER

## 2023-05-16 RX ORDER — METHYLPREDNISOLONE 4 MG/1
TABLET ORAL
Qty: 1 EACH | Refills: 0 | Status: SHIPPED | OUTPATIENT
Start: 2023-05-16

## 2023-05-16 RX ORDER — TRIAMCINOLONE ACETONIDE 1 MG/G
CREAM TOPICAL 3 TIMES DAILY
Qty: 1 EACH | Refills: 0 | Status: SHIPPED | OUTPATIENT
Start: 2023-05-16 | End: 2023-05-30

## 2023-05-16 NOTE — DISCHARGE INSTRUCTIONS
Follow-up with your primary care physician in one week if symptoms have not improved or symptoms are starting to get worse. Increase fluids, keep well-hydrated.   Use the topical cream 3 times daily if next for 10 days if the rash is not going away or starts to spread start the Medrol Dosepak  Over-the-counter calamine lotion or IVy rest can be helpful

## 2023-07-03 ENCOUNTER — OFFICE VISIT (OUTPATIENT)
Dept: FAMILY MEDICINE CLINIC | Facility: CLINIC | Age: 48
End: 2023-07-03
Payer: COMMERCIAL

## 2023-07-03 ENCOUNTER — HOSPITAL ENCOUNTER (OUTPATIENT)
Dept: GENERAL RADIOLOGY | Age: 48
Discharge: HOME | End: 2023-07-03
Attending: FAMILY MEDICINE
Payer: COMMERCIAL

## 2023-07-03 VITALS
HEART RATE: 87 BPM | RESPIRATION RATE: 18 BRPM | OXYGEN SATURATION: 98 % | DIASTOLIC BLOOD PRESSURE: 86 MMHG | BODY MASS INDEX: 36 KG/M2 | SYSTOLIC BLOOD PRESSURE: 130 MMHG | TEMPERATURE: 97 F | WEIGHT: 265.5 LBS

## 2023-07-03 DIAGNOSIS — M25.562 CHRONIC PAIN OF LEFT KNEE: Primary | ICD-10-CM

## 2023-07-03 DIAGNOSIS — L23.9 ALLERGIC CONTACT DERMATITIS OF LOWER LEG: ICD-10-CM

## 2023-07-03 DIAGNOSIS — G89.29 CHRONIC PAIN OF LEFT KNEE: ICD-10-CM

## 2023-07-03 DIAGNOSIS — S83.207A ACUTE TORN MENISCUS OF KNEE, LEFT, INITIAL ENCOUNTER: ICD-10-CM

## 2023-07-03 DIAGNOSIS — G89.29 CHRONIC PAIN OF LEFT KNEE: Primary | ICD-10-CM

## 2023-07-03 DIAGNOSIS — M25.562 CHRONIC PAIN OF LEFT KNEE: ICD-10-CM

## 2023-07-03 DIAGNOSIS — N50.89 MASS OF RIGHT TESTICLE: ICD-10-CM

## 2023-07-03 PROCEDURE — 99214 OFFICE O/P EST MOD 30 MIN: CPT | Performed by: FAMILY MEDICINE

## 2023-07-03 PROCEDURE — 3075F SYST BP GE 130 - 139MM HG: CPT | Performed by: FAMILY MEDICINE

## 2023-07-03 PROCEDURE — 3079F DIAST BP 80-89 MM HG: CPT | Performed by: FAMILY MEDICINE

## 2023-07-03 PROCEDURE — 73560 X-RAY EXAM OF KNEE 1 OR 2: CPT | Performed by: FAMILY MEDICINE

## 2023-07-03 RX ORDER — ROSUVASTATIN CALCIUM 10 MG/1
10 TABLET, COATED ORAL NIGHTLY
COMMUNITY

## 2023-07-03 RX ORDER — CLOBETASOL PROPIONATE 0.5 MG/G
OINTMENT TOPICAL
Qty: 60 G | Refills: 3 | Status: SHIPPED | OUTPATIENT
Start: 2023-07-03

## 2023-07-11 ENCOUNTER — HOSPITAL ENCOUNTER (OUTPATIENT)
Dept: ULTRASOUND IMAGING | Age: 48
Discharge: HOME | End: 2023-07-11
Attending: FAMILY MEDICINE
Payer: COMMERCIAL

## 2023-07-11 DIAGNOSIS — N50.89 MASS OF RIGHT TESTICLE: ICD-10-CM

## 2023-07-11 PROCEDURE — 76870 US EXAM SCROTUM: CPT | Performed by: FAMILY MEDICINE

## 2023-07-11 PROCEDURE — 93975 VASCULAR STUDY: CPT | Performed by: FAMILY MEDICINE

## 2023-07-12 ENCOUNTER — TELEPHONE (OUTPATIENT)
Dept: FAMILY MEDICINE CLINIC | Facility: CLINIC | Age: 48
End: 2023-07-12

## 2023-07-12 RX ORDER — SULFAMETHOXAZOLE AND TRIMETHOPRIM 800; 160 MG/1; MG/1
1 TABLET ORAL 2 TIMES DAILY
Qty: 20 TABLET | Refills: 0 | Status: SHIPPED | OUTPATIENT
Start: 2023-07-12 | End: 2023-07-22

## 2023-07-12 NOTE — TELEPHONE ENCOUNTER
----- Message from Rosemary Arandaw, DO sent at 7/12/2023  7:54 AM CDT -----  Please notify Young Fell his US showed he as  a simple cyst, a fluid filled structure in the duct and not a mass.  This has no potential to become anything serious, generally resolves on it's own, we could add an antibiotic which sometimes can help

## 2023-07-12 NOTE — TELEPHONE ENCOUNTER
Spoke with patient, aware of results and recommendations.     Please send abx to [facility] on [address] in [facility] [place]

## 2023-07-17 ENCOUNTER — PATIENT MESSAGE (OUTPATIENT)
Dept: CASE MANAGEMENT | Age: 48
End: 2023-07-17

## 2023-07-27 ENCOUNTER — TELEPHONE (OUTPATIENT)
Dept: FAMILY MEDICINE CLINIC | Facility: CLINIC | Age: 48
End: 2023-07-27

## 2023-07-27 NOTE — TELEPHONE ENCOUNTER
PATIENT SAYS HE RECEIVED A LETTER SAYING HIS INSURANCE IS NOT COVERING THE MRI. PATIENT SAYS THIS LETTER TOLD HIM THAT DR FARNSWORTH WILL BE RECEIVING A LETTER ALSO SAYING WHY THEY ARE NOT COVERING IT. PATIENT ASKS IF DR FARNSWORTH RECEIVED THIS LETTER.

## 2023-07-31 ENCOUNTER — PATIENT MESSAGE (OUTPATIENT)
Dept: FAMILY MEDICINE CLINIC | Facility: CLINIC | Age: 48
End: 2023-07-31

## 2023-07-31 DIAGNOSIS — R68.82 DECREASED LIBIDO: ICD-10-CM

## 2023-07-31 DIAGNOSIS — R53.83 OTHER FATIGUE: Primary | ICD-10-CM

## 2023-08-01 NOTE — TELEPHONE ENCOUNTER
From: Juli Spencer  To: Ondina Chamberlain DO  Sent: 7/31/2023 7:53 PM CDT  Subject: Testosterone Test    Can you order blood work to test specifically testosterone level? Doing research for lack of energy and low sex drive.

## 2023-08-02 ENCOUNTER — LAB ENCOUNTER (OUTPATIENT)
Dept: LAB | Age: 48
End: 2023-08-02
Attending: FAMILY MEDICINE
Payer: COMMERCIAL

## 2023-08-02 DIAGNOSIS — R53.83 OTHER FATIGUE: ICD-10-CM

## 2023-08-02 DIAGNOSIS — R68.82 DECREASED LIBIDO: ICD-10-CM

## 2023-08-02 PROCEDURE — 84402 ASSAY OF FREE TESTOSTERONE: CPT | Performed by: FAMILY MEDICINE

## 2023-08-02 PROCEDURE — 84403 ASSAY OF TOTAL TESTOSTERONE: CPT | Performed by: FAMILY MEDICINE

## 2023-08-04 NOTE — TELEPHONE ENCOUNTER
Asthma & COPD Medication Protocol [id number]/04/2023 02:13 PM    Asthma Action Score greater than or equal to 20    AAP/ACT given in last 12 months    Appointment in past 6 or next 3 months        Last OV 7/3/23  Last refilled:  12-2-2019 albuterol 8.5 g  3 refills  5/31/22 budesonide/formoterol 10.2 g 3 refill

## 2023-08-05 RX ORDER — ALBUTEROL SULFATE 90 UG/1
2 AEROSOL, METERED RESPIRATORY (INHALATION) EVERY 4 HOURS PRN
Qty: 8.5 G | Refills: 3 | Status: SHIPPED | OUTPATIENT
Start: 2023-08-05

## 2023-08-05 RX ORDER — BUDESONIDE AND FORMOTEROL FUMARATE DIHYDRATE 160; 4.5 UG/1; UG/1
2 AEROSOL RESPIRATORY (INHALATION) DAILY
Qty: 10.2 G | Refills: 3 | Status: SHIPPED | OUTPATIENT
Start: 2023-08-05

## 2023-08-08 LAB
FREE TESTOST DIRECT: 9.3 PG/ML
TESTOSTERONE: 204 NG/DL

## 2023-08-09 ENCOUNTER — TELEPHONE (OUTPATIENT)
Dept: FAMILY MEDICINE CLINIC | Facility: CLINIC | Age: 48
End: 2023-08-09

## 2023-08-09 DIAGNOSIS — R53.83 OTHER FATIGUE: Primary | ICD-10-CM

## 2023-08-09 DIAGNOSIS — R68.82 DECREASED LIBIDO: ICD-10-CM

## 2023-08-09 NOTE — TELEPHONE ENCOUNTER
----- Message from Arthor Mcburney, DO sent at 8/9/2023  3:32 PM CDT -----  So we have  one to start with , his free testosterone is low but his total is normal, but low normal, so lets get a second one in another month

## 2023-08-09 NOTE — TELEPHONE ENCOUNTER
It means his body is still producing some testosterone and able to convert it o the active, (free) form, but total is low normal, but is the source from which the free is formed. Some testosterone can be stored in body fat and if there is too much stored it blocks the bodies ability to produce new testosterone.  And then can lead to these issues

## 2023-08-09 NOTE — TELEPHONE ENCOUNTER
I have personally performed patient's history, physical exam, clinical impressions and treatment plan and the services as described by Jazzy Tovar PA-C    Patient continues to do extremely well is having absolutely no problems in his excess pleased. I will see her back at 5 years earlier on a p.r.n. basis.     Spoke with patient, aware of results and recommendations. Patient would like to know what the results mean?     Please advise    Thank you

## 2023-08-10 ENCOUNTER — HOSPITAL ENCOUNTER (OUTPATIENT)
Dept: MRI IMAGING | Facility: HOSPITAL | Age: 48
Discharge: HOME | End: 2023-08-10
Attending: FAMILY MEDICINE
Payer: COMMERCIAL

## 2023-08-10 DIAGNOSIS — M25.562 CHRONIC PAIN OF LEFT KNEE: ICD-10-CM

## 2023-08-10 DIAGNOSIS — G89.29 CHRONIC PAIN OF LEFT KNEE: ICD-10-CM

## 2023-08-10 DIAGNOSIS — S83.207A ACUTE TORN MENISCUS OF KNEE, LEFT, INITIAL ENCOUNTER: ICD-10-CM

## 2023-08-10 PROCEDURE — 73721 MRI JNT OF LWR EXTRE W/O DYE: CPT | Performed by: FAMILY MEDICINE

## 2023-08-12 ENCOUNTER — TELEPHONE (OUTPATIENT)
Dept: FAMILY MEDICINE CLINIC | Facility: CLINIC | Age: 48
End: 2023-08-12

## 2023-08-12 NOTE — TELEPHONE ENCOUNTER
Advised patient of Doctor's note below. Patient verbalized understanding. Pt agreeable to receive [facility] with Ortho contact info  No further questions at this time.     [facility] sent to pt  Notify me if not read by 08/19/23

## 2023-09-13 ENCOUNTER — LAB ENCOUNTER (OUTPATIENT)
Dept: LAB | Age: 48
End: 2023-09-13
Attending: FAMILY MEDICINE
Payer: COMMERCIAL

## 2023-09-13 DIAGNOSIS — R53.83 OTHER FATIGUE: ICD-10-CM

## 2023-09-13 DIAGNOSIS — R68.82 DECREASED LIBIDO: ICD-10-CM

## 2023-09-13 PROCEDURE — 84402 ASSAY OF FREE TESTOSTERONE: CPT

## 2023-09-13 PROCEDURE — 84403 ASSAY OF TOTAL TESTOSTERONE: CPT

## 2023-09-21 LAB
FREE TESTOST DIRECT: 6.6 PG/ML
TESTOSTERONE: 219 NG/DL

## 2023-09-22 ENCOUNTER — TELEPHONE (OUTPATIENT)
Dept: FAMILY MEDICINE CLINIC | Facility: CLINIC | Age: 48
End: 2023-09-22

## 2023-09-22 NOTE — TELEPHONE ENCOUNTER
Patient's name and  verified   Patient is amenable to starting the testosterone injections   Patient notified and verbalized an understanding

## 2023-09-22 NOTE — TELEPHONE ENCOUNTER
----- Message from Misha Murillo DO sent at 9/22/2023  8:45 AM CDT -----  Please notify Eyad Sanchez there that now there is no question his testosterone is low. I think we need to get him started on some replacement , and it does come in a topical gel, but I think given his young age, he'd probably do better with an injection which he'd inject himself with , into the hip or thigh muscle.  If he's ok with it I can place the order, and we can instruct him on proper technique, and then recheck the levels in a month and reassess where he's at

## 2023-09-25 RX ORDER — TESTOSTERONE CYPIONATE 100 MG/ML
30 INJECTION, SOLUTION INTRAMUSCULAR
Qty: 10 ML | Refills: 1 | Status: SHIPPED | OUTPATIENT
Start: 2023-09-25 | End: 2023-10-25

## 2023-09-28 ENCOUNTER — TELEPHONE (OUTPATIENT)
Dept: FAMILY MEDICINE CLINIC | Facility: CLINIC | Age: 48
End: 2023-09-28

## 2023-09-28 DIAGNOSIS — R68.82 DECREASED LIBIDO: Primary | ICD-10-CM

## 2023-09-28 NOTE — TELEPHONE ENCOUNTER
Advised patient of Doctor's note below. Patient verbalized understanding. No further questions at this time. Pt reports will be [place] in [place]    Per Dr. Hernandez Sandra - ok to resume shots when returns from out of country    Advised pt of note above - he v/u,   Pt to bring in medication for NV - pt v/u. No further questions at this time    Future Appointments   Date Time Provider Ml Frye   9/29/2023  3:00 PM [facility] NURSE ELIGIO Snell     Order(s) pending, please review. Thank you.

## 2023-09-28 NOTE — TELEPHONE ENCOUNTER
Pt reports picked up Rx testosterone and syringes  Pharmacist showed how to use needle, but not where to inject    Pt asking what to do    Should schedule NV for all administrations?     Please advise, thank you

## 2023-09-29 ENCOUNTER — NURSE ONLY (OUTPATIENT)
Dept: FAMILY MEDICINE CLINIC | Facility: CLINIC | Age: 48
End: 2023-09-29
Payer: COMMERCIAL

## 2023-09-29 PROCEDURE — 96372 THER/PROPH/DIAG INJ SC/IM: CPT | Performed by: FAMILY MEDICINE

## 2023-09-29 RX ORDER — TESTOSTERONE CYPIONATE 100 MG/ML
30 INJECTION, SOLUTION INTRAMUSCULAR ONCE
Status: COMPLETED | OUTPATIENT
Start: 2023-09-29 | End: 2023-09-29

## 2023-09-29 RX ADMIN — TESTOSTERONE CYPIONATE 30 MG: 100 INJECTION, SOLUTION INTRAMUSCULAR at 15:18:00

## 2023-09-29 NOTE — PROGRESS NOTES
Mariano Mercado present in office for nurse visit. Pt brought Rx testosterone, syringes, and needles    Advised pt of 's note 09/22/23  Pt v/u and stated is comfortable administering remaining doses after education    Patient supplied medication - form signed    Lot and Expiration date verified with Lashae Doty RN  Administered to right vastus lateralis      Education given to pt - pt v/u, no further questions at this time    All questions/concerns addressed. Patient left in stable condition.

## 2023-11-07 ENCOUNTER — LAB ENCOUNTER (OUTPATIENT)
Dept: LAB | Age: 48
End: 2023-11-07
Attending: INTERNAL MEDICINE
Payer: COMMERCIAL

## 2023-11-07 DIAGNOSIS — I87.2 VENOUS INSUFFICIENCY: ICD-10-CM

## 2023-11-07 DIAGNOSIS — E74.39 GLUCOSE INTOLERANCE: ICD-10-CM

## 2023-11-07 DIAGNOSIS — M79.661 PAIN OF RIGHT LOWER LEG: ICD-10-CM

## 2023-11-07 DIAGNOSIS — E78.2 MIXED HYPERLIPIDEMIA: Primary | ICD-10-CM

## 2023-11-07 LAB
ALBUMIN SERPL-MCNC: 3.8 G/DL (ref 3.4–5)
ALBUMIN/GLOB SERPL: 1 {RATIO} (ref 1–2)
ALP LIVER SERPL-CCNC: 77 U/L
ALT SERPL-CCNC: 52 U/L
ANION GAP SERPL CALC-SCNC: 3 MMOL/L (ref 0–18)
AST SERPL-CCNC: 34 U/L (ref 15–37)
BILIRUB SERPL-MCNC: 1.7 MG/DL (ref 0.1–2)
BUN BLD-MCNC: 15 MG/DL (ref 9–23)
CALCIUM BLD-MCNC: 9.5 MG/DL (ref 8.5–10.1)
CHLORIDE SERPL-SCNC: 109 MMOL/L (ref 98–112)
CHOLEST SERPL-MCNC: 146 MG/DL (ref ?–200)
CO2 SERPL-SCNC: 30 MMOL/L (ref 21–32)
CREAT BLD-MCNC: 1.09 MG/DL
EGFRCR SERPLBLD CKD-EPI 2021: 84 ML/MIN/1.73M2 (ref 60–?)
EST. AVERAGE GLUCOSE BLD GHB EST-MCNC: 128 MG/DL (ref 68–126)
FASTING PATIENT LIPID ANSWER: YES
FASTING STATUS PATIENT QL REPORTED: YES
GLOBULIN PLAS-MCNC: 3.7 G/DL (ref 2.8–4.4)
GLUCOSE BLD-MCNC: 97 MG/DL (ref 70–99)
HBA1C MFR BLD: 6.1 % (ref ?–5.7)
HDLC SERPL-MCNC: 54 MG/DL (ref 40–59)
LDLC SERPL CALC-MCNC: 77 MG/DL (ref ?–100)
NONHDLC SERPL-MCNC: 92 MG/DL (ref ?–130)
OSMOLALITY SERPL CALC.SUM OF ELEC: 295 MOSM/KG (ref 275–295)
POTASSIUM SERPL-SCNC: 4.1 MMOL/L (ref 3.5–5.1)
PROT SERPL-MCNC: 7.5 G/DL (ref 6.4–8.2)
SODIUM SERPL-SCNC: 142 MMOL/L (ref 136–145)
TRIGL SERPL-MCNC: 80 MG/DL (ref 30–149)
VLDLC SERPL CALC-MCNC: 12 MG/DL (ref 0–30)

## 2023-11-07 PROCEDURE — 80061 LIPID PANEL: CPT

## 2023-11-07 PROCEDURE — 36415 COLL VENOUS BLD VENIPUNCTURE: CPT

## 2023-11-07 PROCEDURE — 83036 HEMOGLOBIN GLYCOSYLATED A1C: CPT

## 2023-11-07 PROCEDURE — 80053 COMPREHEN METABOLIC PANEL: CPT

## 2023-11-08 ENCOUNTER — TELEPHONE (OUTPATIENT)
Dept: FAMILY MEDICINE CLINIC | Facility: CLINIC | Age: 48
End: 2023-11-08

## 2023-11-08 NOTE — TELEPHONE ENCOUNTER
----- Message from Nguyễn Green DO sent at 11/8/2023  5:47 AM CST -----  Please notify Laurita Cruz that his BS is still in the prediabetic range, at 6.1. We could maybe add a single dose of metformin to keep it low. Are we still his primary? It looks like he has a Dr Jakub Santos listed as his primary care physician?

## 2023-11-08 NOTE — TELEPHONE ENCOUNTER
Spoke with patient, aware of results and recommendations. Patient would like to start Metformin. Please send to [place] [address] in [facility].     Patient aware DS out of office till 11/13/23

## 2024-01-25 ENCOUNTER — APPOINTMENT (OUTPATIENT)
Dept: DERMATOLOGY | Age: 49
End: 2024-01-25

## 2024-01-25 DIAGNOSIS — Z12.83 SKIN CANCER SCREENING: ICD-10-CM

## 2024-01-25 DIAGNOSIS — D17.22 LIPOMA OF LEFT FOREARM: ICD-10-CM

## 2024-01-25 DIAGNOSIS — D22.9 MULTIPLE NEVI: Primary | ICD-10-CM

## 2024-01-25 DIAGNOSIS — D23.9 DERMATOFIBROMA OF MULTIPLE SITES: ICD-10-CM

## 2024-01-25 PROCEDURE — 99213 OFFICE O/P EST LOW 20 MIN: CPT | Performed by: DERMATOLOGY

## 2024-01-25 RX ORDER — ROSUVASTATIN CALCIUM 10 MG/1
10 TABLET, COATED ORAL DAILY
COMMUNITY

## 2024-01-27 NOTE — TELEPHONE ENCOUNTER
LOV 07/03/23  Last refill on 08/005/23, for #10.2g, with 3 refills  Budesonide-Formoterol Fumarate 160-4.5 MCG/ACT Inhalation Aerosol     Asthma & COPD Medication Protocol [id number]/26/2024 11:15 PM    Asthma Action Score greater than or equal to 20    Appointment in past 6 or next 3 months    AAP/ACT given in last 12 months     No future appointments.    Order(s) pending, please review. Thank you.

## 2024-01-28 RX ORDER — BUDESONIDE AND FORMOTEROL FUMARATE DIHYDRATE 160; 4.5 UG/1; UG/1
2 AEROSOL RESPIRATORY (INHALATION) DAILY
Qty: 30.6 G | Refills: 2 | Status: SHIPPED | OUTPATIENT
Start: 2024-01-28

## 2024-01-31 ENCOUNTER — TELEPHONE (OUTPATIENT)
Dept: FAMILY MEDICINE CLINIC | Facility: CLINIC | Age: 49
End: 2024-01-31

## 2024-01-31 ENCOUNTER — LAB ENCOUNTER (OUTPATIENT)
Dept: LAB | Age: 49
End: 2024-01-31
Attending: FAMILY MEDICINE
Payer: COMMERCIAL

## 2024-01-31 DIAGNOSIS — R68.82 DECREASED LIBIDO: ICD-10-CM

## 2024-01-31 DIAGNOSIS — R53.83 OTHER FATIGUE: ICD-10-CM

## 2024-01-31 DIAGNOSIS — R68.82 DECREASED LIBIDO: Primary | ICD-10-CM

## 2024-01-31 PROCEDURE — 84403 ASSAY OF TOTAL TESTOSTERONE: CPT

## 2024-01-31 PROCEDURE — 84402 ASSAY OF FREE TESTOSTERONE: CPT

## 2024-02-09 DIAGNOSIS — E29.1 HYPOGONADISM IN MALE: Primary | ICD-10-CM

## 2024-02-09 LAB
FREE TESTOST DIRECT: 4.9 PG/ML
TESTOSTERONE: 262 NG/DL

## 2024-02-09 RX ORDER — TESTOSTERONE CYPIONATE 1000 MG/10ML
50 INJECTION, SOLUTION INTRAMUSCULAR
Qty: 10 ML | Refills: 0 | Status: SHIPPED | OUTPATIENT
Start: 2024-02-09 | End: 2024-03-10

## 2024-02-23 NOTE — TELEPHONE ENCOUNTER
Pt failed refill protocol for the following reasons:  Name from pharmacy: METFORMIN 500MG TABLETS          Will file in chart as: METFORMIN 500 MG Oral Tab    Sig: TAKE 1 TABLET(500 MG) BY MOUTH DAILY WITH BREAKFAST    Disp: 30 tablet    Refills: 3 (Pharmacy requested: Not specified)    Start: 2/23/2024    Class: Normal    Non-formulary    Last ordered: 3 months ago (11/8/2023) by Zach Carver DO    Last refill: 1/29/2024    Rx #: [id number]    Diabetes Medication Protocol Masipb2202/23/2024 07:56 AM   Protocol Details In person appointment or virtual visit in the past 6 mos or appointment in next 3 mos    Microalbumin procedure in past 12 months or taking ACE/ARB    Last A1C < 7.5 and within past 6 months    EGFRCR or GFRNAA > 50    GFR in the past 12 months      To be filled at: [facility] #[id number] - [place], [place] - [address] [facility]  AT [address] [place] [address], [phone], [phone]         Last refill: 1/8/23  Last appt: 7/3/23  Next appt: No future appointments.      Forward to Dr. Carver, please advise on refills. Thank you.

## 2024-03-19 ENCOUNTER — TELEPHONE (OUTPATIENT)
Dept: FAMILY MEDICINE CLINIC | Facility: CLINIC | Age: 49
End: 2024-03-19

## 2024-03-19 NOTE — TELEPHONE ENCOUNTER
Lab Frequency Next Occurrence   Testosterone,Free And Total (Female/Child) [E] Once 02/09/2024     Letter mailed to patient reminding them they have outstanding orders.

## 2024-03-20 ENCOUNTER — TELEPHONE (OUTPATIENT)
Dept: FAMILY MEDICINE CLINIC | Facility: CLINIC | Age: 49
End: 2024-03-20

## 2024-03-25 DIAGNOSIS — E29.1 HYPOGONADISM IN MALE: ICD-10-CM

## 2024-03-25 RX ORDER — TESTOSTERONE CYPIONATE 1000 MG/10ML
50 INJECTION, SOLUTION INTRAMUSCULAR
Qty: 10 ML | Refills: 1 | Status: SHIPPED | OUTPATIENT
Start: 2024-03-25 | End: 2024-04-24

## 2024-03-25 NOTE — TELEPHONE ENCOUNTER
He was supposed to continue to take it and then retest , if he's out of it he needs to go back on because it won;t be accurate  
Patient had repeat Testosterone done 1/31/24    Future lab placed on 2/9/24, no result note placed on lab from 1/31/24.     Please advise    Thank you  
Patient received outreach message about missing lab    He had testosterone done 1/31/24    He was not aware of when this needed to be repeated    Please adv  Thank you  
normal (ped)...

## 2024-05-02 ENCOUNTER — LAB ENCOUNTER (OUTPATIENT)
Dept: LAB | Age: 49
End: 2024-05-02
Attending: INTERNAL MEDICINE
Payer: COMMERCIAL

## 2024-05-02 DIAGNOSIS — E29.1 HYPOGONADISM IN MALE: ICD-10-CM

## 2024-05-02 DIAGNOSIS — E78.2 MIXED HYPERLIPIDEMIA: Primary | ICD-10-CM

## 2024-05-02 LAB
ALBUMIN SERPL-MCNC: 3.8 G/DL (ref 3.4–5)
ALBUMIN/GLOB SERPL: 1.2 {RATIO} (ref 1–2)
ALP LIVER SERPL-CCNC: 72 U/L
ALT SERPL-CCNC: 42 U/L
ANION GAP SERPL CALC-SCNC: 6 MMOL/L (ref 0–18)
AST SERPL-CCNC: 25 U/L (ref 15–37)
BILIRUB SERPL-MCNC: 1.4 MG/DL (ref 0.1–2)
BUN BLD-MCNC: 17 MG/DL (ref 9–23)
CALCIUM BLD-MCNC: 9.5 MG/DL (ref 8.5–10.1)
CHLORIDE SERPL-SCNC: 107 MMOL/L (ref 98–112)
CHOLEST SERPL-MCNC: 152 MG/DL (ref ?–200)
CO2 SERPL-SCNC: 26 MMOL/L (ref 21–32)
CREAT BLD-MCNC: 1 MG/DL
EGFRCR SERPLBLD CKD-EPI 2021: 93 ML/MIN/1.73M2 (ref 60–?)
FASTING PATIENT LIPID ANSWER: YES
FASTING STATUS PATIENT QL REPORTED: YES
GLOBULIN PLAS-MCNC: 3.3 G/DL (ref 2.8–4.4)
GLUCOSE BLD-MCNC: 101 MG/DL (ref 70–99)
HDLC SERPL-MCNC: 67 MG/DL (ref 40–59)
LDLC SERPL CALC-MCNC: 71 MG/DL (ref ?–100)
NONHDLC SERPL-MCNC: 85 MG/DL (ref ?–130)
OSMOLALITY SERPL CALC.SUM OF ELEC: 290 MOSM/KG (ref 275–295)
POTASSIUM SERPL-SCNC: 3.8 MMOL/L (ref 3.5–5.1)
PROT SERPL-MCNC: 7.1 G/DL (ref 6.4–8.2)
SODIUM SERPL-SCNC: 139 MMOL/L (ref 136–145)
TRIGL SERPL-MCNC: 69 MG/DL (ref 30–149)
VLDLC SERPL CALC-MCNC: 11 MG/DL (ref 0–30)

## 2024-05-02 PROCEDURE — 80053 COMPREHEN METABOLIC PANEL: CPT

## 2024-05-02 PROCEDURE — 36415 COLL VENOUS BLD VENIPUNCTURE: CPT

## 2024-05-02 PROCEDURE — 80061 LIPID PANEL: CPT

## 2024-05-22 ENCOUNTER — MED REC SCAN ONLY (OUTPATIENT)
Dept: FAMILY MEDICINE CLINIC | Facility: CLINIC | Age: 49
End: 2024-05-22

## 2024-06-25 ENCOUNTER — HOSPITAL ENCOUNTER (OUTPATIENT)
Age: 49
Discharge: HOME | End: 2024-06-25

## 2024-06-25 VITALS
HEIGHT: 72 IN | OXYGEN SATURATION: 97 % | TEMPERATURE: 97 F | DIASTOLIC BLOOD PRESSURE: 90 MMHG | SYSTOLIC BLOOD PRESSURE: 141 MMHG | BODY MASS INDEX: 31.15 KG/M2 | WEIGHT: 230 LBS | RESPIRATION RATE: 16 BRPM | HEART RATE: 64 BPM

## 2024-06-25 DIAGNOSIS — L24.7 IRRITANT CONTACT DERMATITIS DUE TO PLANTS, EXCEPT FOOD: Primary | ICD-10-CM

## 2024-06-25 PROCEDURE — 99213 OFFICE O/P EST LOW 20 MIN: CPT | Performed by: NURSE PRACTITIONER

## 2024-06-25 RX ORDER — PREDNISONE 10 MG/1
TABLET ORAL
Qty: 20 TABLET | Refills: 0 | Status: SHIPPED | OUTPATIENT
Start: 2024-06-25 | End: 2024-07-01

## 2024-06-25 NOTE — ED INITIAL ASSESSMENT (HPI)
Patient reports working outside in some weeds on Sunday. Reports possible poison ivy exposure to right forearm and left lower leg. Using triamcinolone cream from previous skin issue.

## 2024-06-25 NOTE — ED PROVIDER NOTES
Patient Seen in: [facility]      History     Chief Complaint   Patient presents with    Rash Skin Problem     Stated Complaint: rash    Subjective:   49-year-old male presents today with possible poison ivy to the right forearm and right lower leg.  States was working outside.  States gets this about once a year.  Denies any signs of sore tongue.  No difficulty swallowing controlling secretions.  No stridor shortness of breath.  Denies any other symptoms or concerns.  The patient's medication list, past medical history and social history elements as listed in today's nurse's notes were reviewed and agreed (except as otherwise stated in the HPI).  The patient's family history reviewed and determined to be noncontributory to the presenting problem            Objective:   No pertinent past medical history.            No pertinent past surgical history.              No pertinent social history.            Review of Systems    Positive for stated Chief Complaint: Rash Skin Problem    Other systems are as noted in HPI.  Constitutional and vital signs reviewed.      All other systems reviewed and negative except as noted above.    Physical Exam     ED Triage Vitals [06/25/24 0946]   /90   Pulse 64   Resp 16   Temp 97.1 °F (36.2 °C)   Temp src Temporal   SpO2 97 %   O2 Device None (Room air)       Current Vitals:   Vital Signs  BP: 141/90  Pulse: 64  Resp: 16  Temp: 97.1 °F (36.2 °C)  Temp src: Temporal    Oxygen Therapy  SpO2: 97 %  O2 Device: None (Room air)            Physical Exam  Vitals and nursing note reviewed.   Constitutional:       Appearance: Normal appearance.   HENT:      Mouth/Throat:      Mouth: Mucous membranes are moist.   Cardiovascular:      Rate and Rhythm: Normal rate.   Pulmonary:      Effort: Pulmonary effort is normal.   Skin:     General: Skin is warm and dry.      Comments: Raised erythematous irregular border rashes noted to the right forearm and right ankle.  No drainage noted  at this time.   Neurological:      Mental Status: He is alert and oriented to person, place, and time.               ED Course   Labs Reviewed - No data to display                   MDM     Please note that this report has been produced using speech recognition software and may contain errors related to that system including, but not limited to, errors in grammar, punctuation, and spelling, as well as words and phrases that possibly may have been recognized inappropriately.  If there are any questions or concerns, contact the dictating provider for clarification.        Note to patient: The 21st Century Cures Act makes medical notes like these available to patients in the interest of transparency. However, this is a medical document intended as peer to peer communication. It is written in medical language and may contain abbreviations or verbiage that are unfamiliar. It may appear blunt or direct. Medical documents are intended to carry relevant information, facts as evident, and the clinical opinion of the practitioner.                                   Medical Decision Making  Differential diagnosis includes but is not limited to: Allergic reaction/hives, fungal infection, viral exanthem, necrotizing fasciitis      Presented today with a rash to the right forearm and ankle after being outside working.  Concern for possible poison ivy.  States gets this once a year.  On exam I do agree do suspect contact dermatitis as cause of rash.  Will give prescription for prednisone taper.  Encouraged take over-the-counter antihistamine to include Pepcid.  Skin care instructions were given.  Patient verbalized understanding and agreed with plan of care.    Risk  OTC drugs.  Prescription drug management.        Disposition and Plan     Clinical Impression:  1. Irritant contact dermatitis due to plants, except food         Disposition:  Discharge  6/25/2024 10:04 am    Follow-up:  Zach Carver DO  [address]  [facility] [address]  [phone]                Medications Prescribed:  Current Discharge Medication List        START taking these medications    Details   predniSONE 10 MG Oral Tab Take 4 tablets (40 mg total) by mouth daily for 2 days, THEN 3 tablets (30 mg total) daily for 2 days, THEN 2 tablets (20 mg total) daily for 2 days, THEN 1 tablet (10 mg total) daily for 2 days.  Qty: 20 tablet, Refills: 0

## 2024-06-27 NOTE — TELEPHONE ENCOUNTER
Diabetes Medication Protocol [id number]/26/2024 07:23 PM   Protocol Details Last A1C < 7.5 and within past 6 months    In person appointment or virtual visit in the past 6 mos or appointment in next 3 mos    Microalbumin procedure in past 12 months or taking ACE/ARB    EGFRCR or GFRNAA > 50    GFR in the past 12 months     Metformin    Last time medication was refilled 2/24/24  Quantity and number of refills 30 w/ 0  Last OV 7/3/23  Next OV none scheduled.    Routed to  to call and schedule patient and route back to triage.

## 2024-07-01 ENCOUNTER — OFFICE VISIT (OUTPATIENT)
Dept: FAMILY MEDICINE CLINIC | Facility: CLINIC | Age: 49
End: 2024-07-01
Payer: COMMERCIAL

## 2024-07-01 VITALS
WEIGHT: 239.81 LBS | HEART RATE: 66 BPM | OXYGEN SATURATION: 96 % | DIASTOLIC BLOOD PRESSURE: 78 MMHG | SYSTOLIC BLOOD PRESSURE: 132 MMHG | TEMPERATURE: 98 F | BODY MASS INDEX: 33 KG/M2

## 2024-07-01 DIAGNOSIS — M23.322 DERANGEMENT OF POSTERIOR HORN OF MEDIAL MENISCUS OF LEFT KNEE: ICD-10-CM

## 2024-07-01 DIAGNOSIS — Z00.00 ROUTINE GENERAL MEDICAL EXAMINATION AT A HEALTH CARE FACILITY: Primary | ICD-10-CM

## 2024-07-01 NOTE — PROGRESS NOTES
Khang Gamez is a 49 year old male who presents for a complete physical exam.   HPI:   Pt has been losing weight intentionally, has been doing a lot of work on his house and chasity watching his diet. We recently adjuste his testosterone from gel to injectable, seems to be doing well, still having issues with  his left knee, went through PT saw Dr. Jones  with [facility] and was told he was not a candidate for surgery. Requesting 2nd opinion. Asthma has been stable, has not had his PCV 20.    Immunization History   Administered Date(s) Administered    Covid-19 Vaccine Moderna 100 mcg/0.5 ml 03/09/2021, 04/06/2021, 11/15/2021    Covid-19 Vaccine Pfizer Bivalent 30mcg/0.3mL 12/11/2022    FLULAVAL 6 months & older 0.5 ml Prefilled syringe (72364) 10/12/2017, 09/29/2020    FLUZONE 6 months and older PFS 0.5 ml (70483) 12/22/2016, 09/29/2020    Flucelvax 0.5ml Vaccine 11/19/2019    Fluvirin, 3 Years & >, Im 12/27/2005    HEP B 06/14/2010, 07/12/2010, 01/10/2011    Influenza 01/10/2014, 11/19/2019    Pfizer Covid-19 Vaccine 30mcg/0.3ml 12yrs+ ([id number]) 10/02/2023    Pneumococcal Conjugate PCV20 07/01/2024    TD 09/20/2002    TDAP 08/28/2014     Wt Readings from Last 6 Encounters:   07/01/24 239 lb 12.8 oz (108.8 kg)   06/25/24 230 lb (104.3 kg)   07/03/23 265 lb 8 oz (120.4 kg)   05/16/23 255 lb (115.7 kg)   10/17/22 270 lb 3.2 oz (122.6 kg)   10/04/22 255 lb (115.7 kg)     Body mass index is 32.52 kg/m².     Lab Results   Component Value Date     (H) 05/02/2024    GLU 97 11/07/2023    GLU 91 05/04/2023     Lab Results   Component Value Date    CHOLEST 152 05/02/2024    CHOLEST 146 11/07/2023    CHOLEST 194 05/04/2023     Lab Results   Component Value Date    HDL 67 (H) 05/02/2024    HDL 54 11/07/2023    HDL 53 05/04/2023     Lab Results   Component Value Date    LDL 71 05/02/2024    LDL 77 11/07/2023     (H) 05/04/2023     Lab Results   Component Value Date    AST 25 05/02/2024    AST 34 11/07/2023    AST 35  05/04/2023     Lab Results   Component Value Date    ALT 42 05/02/2024    ALT 52 11/07/2023    ALT 64 (H) 05/04/2023     No results found for: \"PSA\"     Current Outpatient Medications   Medication Sig Dispense Refill    METFORMIN 500 MG Oral Tab TAKE 1 TABLET(500 MG) BY MOUTH DAILY WITH BREAKFAST 30 tablet 3    testosterone cypionate 200 mg/mL Intramuscular Solution Inject 0.13 mL (26 mg total) into the muscle every 14 (fourteen) days. 10 mL 2    Budesonide-Formoterol Fumarate 160-4.5 MCG/ACT Inhalation Aerosol Inhale 2 puffs into the lungs daily. 30.6 g 2    Syringe, Disposable, 1 ML Does not apply Misc For use with testosterone 0.3 ml Q 2weeks 100 each 0    Needle, Disp, 23G X 1-1/2\" Does not apply Misc Use with 1 ml syringe, draw 0.3 ml testosterone, inject IM Q2 weeks 100 each 0    albuterol 108 (90 Base) MCG/ACT Inhalation Aero Soln Inhale 2 puffs into the lungs every 4 (four) hours as needed for Wheezing. 8.5 g 3    fluticasone propionate 50 MCG/ACT Nasal Suspension fluticasone propionate 50 mcg/actuation nasal spray,suspension, [RxNorm: 2439265]      rosuvastatin 10 MG Oral Tab Take 1 tablet (10 mg total) by mouth nightly.      clobetasol 0.05 % External Ointment Apply to the affected area bid for 10 days 60 g 3      Past Medical History:    Asthma (HCC)    Elevated liver enzymes    Esophageal reflux    High cholesterol    Hyperlipidemia    Prepatellar bursitis    Routine general medical examination at a health care facility    Unspecified asthma(493.90)      Past Surgical History:   Procedure Laterality Date    Colonoscopy N/A 10/28/2016    Procedure: COLONOSCOPY;  Surgeon: Grover Wagner DO;  Location:  ENDOSCOPY    Colonoscopy N/A 11/1/2021    Procedure: COLONOSCOPY with Hot Polypectomy & ESOPHAGOGASTRODUODENOSCOPY (EGD) with Biopsy;  Surgeon: Grover Wagner DO;  Location:  ENDOSCOPY      Family History   Problem Relation Age of Onset    Breast Cancer Mother       Social History:  Social History      Socioeconomic History    Marital status:    Occupational History    Occupation:    Tobacco Use    Smoking status: Never    Smokeless tobacco: Current     Last attempt to quit: 10/20/2010   Vaping Use    Vaping status: Never Used   Substance and Sexual Activity    Alcohol use: Yes     Alcohol/week: 3.0 - 4.0 standard drinks of alcohol     Types: 3 - 4 Cans of beer per week     Comment: per day    Drug use: No   Other Topics Concern    Seat Belt Yes     Comment: 100%     Social Determinants of Health      [facility]    Social Connections    [facility]    Housing Stability      Occ: remodels. : . Children:  .   Exercise: three times per week.  Diet: watches calories closely     REVIEW OF SYSTEMS:   GENERAL: feels well otherwise  SKIN: denies any unusual skin lesions  EYES:denies blurred vision or double vision  HEENT: denies nasal congestion, sinus pain or ST  LUNGS: denies shortness of breath with exertion  CARDIOVASCULAR: denies chest pain on exertion  GI: denies abdominal pain,denies heartburn  : denies nocturia or changes in stream  MUSCULOSKELETAL: denies back pain  NEURO: denies headaches  PSYCHE: denies depression or anxiety  HEMATOLOGIC: denies hx of anemia  ENDOCRINE: denies thyroid history  ALL/ASTHMA: denies hx of allergy or asthma    EXAM:   /78   Pulse 66   Temp 98.4 °F (36.9 °C) (Temporal)   Wt 239 lb 12.8 oz (108.8 kg)   SpO2 96%   BMI 32.52 kg/m²   Body mass index is 32.52 kg/m².   GENERAL: well developed, well nourished,in no apparent distress  SKIN: no rashes,no suspicious lesions  HEENT: atraumatic, normocephalic,ears and throat are clear  EYES:PERRLA, EOMI, normal optic disk,conjunctiva are clear  NECK: supple,no adenopathy,no bruits  CHEST: no chest tenderness  LUNGS: clear to auscultation  CARDIO: RRR without murmur  GI:  good BS's,no masses, HSM or tenderness  : two descended testes,no masses,no hernia,no penile lesions  MUSCULOSKELETAL: back is not tender,FROM of the back  EXTREMITIES: no cyanosis, clubbing or edema  NEURO: Oriented times three,cranial nerves are intact,motor and sensory are grossly intact    ASSESSMENT AND PLAN:   Khang Gamez is a 49 year old male who presents for a complete physical exam.  Pt's weight is Body mass index is 32.52 kg/m²., recommended low fat diet and aerobic exercise 30 minutes three times weekly. Health maintenance, will check fasting   COMPLETE BLOOD COUNT, THYROID STIMULATING HORMONE, TESTOSTERONE AND A1C Due for screening colonoscopy 11/2024. Pt info handouts given for: exercise, low fat diet, testicular self exam and prostate cancer screening. The patient indicates understanding of these issues and agrees to the plan.  Encounter Diagnoses   Name Primary?    Routine general medical examination at a health care facility Yes    Derangement of posterior horn of medial meniscus of left knee        Orders Placed This Encounter   Procedures    TSH and Free T4 [E]    CBC W Differential W Platelet [E]    Prevnar 20 (PCV20) [70674]       Meds & Refills for this Visit:  Requested Prescriptions      No prescriptions requested or ordered in this encounter       Imaging & Consults:  PCV20 VACCINE FOR INTRAMUSCULAR USE  ORTHOPEDIC - INTERNAL    The patient is asked to return for CPX in 1 year  .

## 2024-07-17 ENCOUNTER — APPOINTMENT (OUTPATIENT)
Dept: DERMATOLOGY | Age: 49
End: 2024-07-17

## 2024-07-17 DIAGNOSIS — L23.7 POISON IVY DERMATITIS: ICD-10-CM

## 2024-07-17 DIAGNOSIS — D23.5 BENIGN NEOPLASM OF SKIN OF TRUNK, EXCEPT SCROTUM: ICD-10-CM

## 2024-07-17 DIAGNOSIS — D17.22 LIPOMA OF LEFT UPPER EXTREMITY: ICD-10-CM

## 2024-07-17 DIAGNOSIS — D23.9 DERMATOFIBROMA OF MULTIPLE SITES: Primary | ICD-10-CM

## 2024-07-17 RX ORDER — ALBUTEROL SULFATE 90 UG/1
2 AEROSOL, METERED RESPIRATORY (INHALATION) EVERY 4 HOURS PRN
COMMUNITY
Start: 2024-06-28

## 2024-07-17 RX ORDER — TESTOSTERONE CYPIONATE 200 MG/ML
26 INJECTION, SOLUTION INTRAMUSCULAR
COMMUNITY
Start: 2024-03-27

## 2024-08-22 ENCOUNTER — OFFICE VISIT (OUTPATIENT)
Dept: SURGERY | Facility: CLINIC | Age: 49
End: 2024-08-22
Payer: COMMERCIAL

## 2024-08-22 DIAGNOSIS — K63.5 POLYP OF COLON, UNSPECIFIED PART OF COLON, UNSPECIFIED TYPE: Primary | ICD-10-CM

## 2024-08-22 PROCEDURE — 99243 OFF/OP CNSLTJ NEW/EST LOW 30: CPT | Performed by: SURGERY

## 2024-08-22 RX ORDER — SODIUM, POTASSIUM,MAG SULFATES 17.5-3.13G
SOLUTION, RECONSTITUTED, ORAL ORAL
Qty: 1 EACH | Refills: 0 | Status: SHIPPED | OUTPATIENT
Start: 2024-08-22 | End: 2024-08-22

## 2024-08-22 RX ORDER — POLYETHYLENE GLYCOL 3350, SODIUM CHLORIDE, SODIUM BICARBONATE, POTASSIUM CHLORIDE 420; 11.2; 5.72; 1.48 G/4L; G/4L; G/4L; G/4L
POWDER, FOR SOLUTION ORAL
Qty: 1 EACH | Refills: 0 | Status: SHIPPED | OUTPATIENT
Start: 2024-08-22

## 2024-08-22 RX ORDER — POLYETHYLENE GLYCOL 3350, SODIUM CHLORIDE, SODIUM BICARBONATE, POTASSIUM CHLORIDE 420; 11.2; 5.72; 1.48 G/4L; G/4L; G/4L; G/4L
POWDER, FOR SOLUTION ORAL
Qty: 1 EACH | Refills: 0 | Status: SHIPPED | OUTPATIENT
Start: 2024-08-22 | End: 2024-08-22

## 2024-08-22 NOTE — H&P
Khang Gamez is a 49 year old male  Chief Complaint   Patient presents with    Colonoscopy     New patient- colonoscopy consult. Last colonoscopy/egd done 11/1/2021.        REFERRED BY    Patient presents for colonoscopy.  Patient had prior colonoscopy by me 3 years ago.  Adenomatous polyps were found at that time.  Patient also 1 underwent EGD examination at that time with evidence of inflammation of the distal esophagus.  Plan today is for colonoscopy going forward.  Patient denies change in bowel habits chronic abdominal pain or unexplained weight loss.       .           Past Medical History:    Asthma (HCC)    Elevated liver enzymes    Esophageal reflux    High cholesterol    Hyperlipidemia    Prepatellar bursitis    Routine general medical examination at a health care facility    Unspecified asthma(493.90)     Past Surgical History:   Procedure Laterality Date    Colonoscopy N/A 10/28/2016    Procedure: COLONOSCOPY;  Surgeon: Grover Wagner DO;  Location:  ENDOSCOPY    Colonoscopy N/A 11/1/2021    Procedure: COLONOSCOPY with Hot Polypectomy & ESOPHAGOGASTRODUODENOSCOPY (EGD) with Biopsy;  Surgeon: Grover Wagner DO;  Location:  ENDOSCOPY     Current Outpatient Medications on File Prior to Visit   Medication Sig Dispense Refill    METFORMIN 500 MG Oral Tab TAKE 1 TABLET(500 MG) BY MOUTH DAILY WITH BREAKFAST 30 tablet 3    testosterone cypionate 200 mg/mL Intramuscular Solution Inject 0.13 mL (26 mg total) into the muscle every 14 (fourteen) days. 10 mL 2    Budesonide-Formoterol Fumarate 160-4.5 MCG/ACT Inhalation Aerosol Inhale 2 puffs into the lungs daily. 30.6 g 2    Syringe, Disposable, 1 ML Does not apply Misc For use with testosterone 0.3 ml Q 2weeks 100 each 0    Needle, Disp, 23G X 1-1/2\" Does not apply Misc Use with 1 ml syringe, draw 0.3 ml testosterone, inject IM Q2 weeks 100 each 0    albuterol 108 (90 Base) MCG/ACT Inhalation Aero Soln Inhale 2 puffs into the lungs every 4 (four) hours  as needed for Wheezing. 8.5 g 3    fluticasone propionate 50 MCG/ACT Nasal Suspension fluticasone propionate 50 mcg/actuation nasal spray,suspension, [RxNorm: [id number]]      rosuvastatin 10 MG Oral Tab Take 1 tablet (10 mg total) by mouth nightly.      clobetasol 0.05 % External Ointment Apply to the affected area bid for 10 days 60 g 3     No current facility-administered medications on file prior to visit.     @ALL@  Family History   Problem Relation Age of Onset    Breast Cancer Mother      Social History     Socioeconomic History    Marital status:    Occupational History    Occupation:    Tobacco Use    Smoking status: Never    Smokeless tobacco: Current     Last attempt to quit: 10/20/2010   Vaping Use    Vaping status: Never Used   Substance and Sexual Activity    Alcohol use: Yes     Alcohol/week: 3.0 - 4.0 standard drinks of alcohol     Types: 3 - 4 Cans of beer per week     Comment: per day    Drug use: No   Other Topics Concern    Seat Belt Yes     Comment: 100%     Social Determinants of Health      [facility]    Social Connections    [facility]    Housing Stability       ROS     GENERAL HEALTH: otherwise feels well, no weight loss, no fever or chills  SKIN: denies any unusual skin rashes or jaundice  HEENT: denies nasal congestion, sinus pain or sore throat; hearing loss negative,   EYES , no diplopia or vision changes  RESPIRATORY: denies shortness of breath, wheezing or cough   CARDIOVASCULAR: denies chest pain or EMERSON; no palpitations   GI: denies nausea, vomiting, constipation, diarrhea; no rectal bleeding; no heartburn  GENITAL/: no dysuria, urgency or frequency, no tea colored urine  MUSCULOSKELETAL: no joint complaints upper or lower extremities  HEMATOLOGY: denies hx anemia; denies bruising or excessive bleeding  Endocrine: no weight gain or loss no  hot or cold intolerance    EXAM     There were no vitals taken for this visit.  GENERAL: well developed, well nourished male, in no apparent distress.    MENTAL STATUS :Alert, oriented x 3  PSYCH: normal mood and affect  SKIN: anicteric, no rashes, no bruising  EYES: PERRLA, EOMI, sclera anicteric,  conjunctiva without pallor  HEENT: normocephalic, atraumatic, TMs clear, nares patent, mouth moist, pharynx w/o erythema  NECK: supple, no JVD, no adenopathy, no thyromegaly  RESPIRATORY: clear to auscultation, no rales , rhonchi or wheezing  CARDIOVASCULAR: RRR, murmur negative  ABDOMEN: normal active BS, soft, nondistended  no HSM, no masses or hernias  LYMPHATIC: no axillary , supraclavicular or inguinal lymphadenopathy  EXTREMITIES: no cyanosis, clubbing or edema, no atrophy, full ROM    STUDIES:     [facility] Lab Encounter on 05/02/2024   Component Date Value Ref Range Status    Cholesterol, Total 05/02/2024 152  <200 mg/dL Final    Desirable  <200 mg/dL  Borderline  200-239 mg/dL  High      >=240 mg/dL        HDL Cholesterol 05/02/2024 67 (H)  40 - 59 mg/dL Final    Interpretive Information:   An HDL cholesterol <40 mg/dL is low and constitutes a coronary heart disease risk factor. An HDL cholesterol >60 mg/dL is a negative risk factor for coronary heart disease.        Triglycerides 05/02/2024 69  30 - 149 mg/dL Final    Reference interval for fasting triglycerides  Desirable: <150 mg/dL  Borderline: 150-199 mg/dL  High: 200-499 mg/dL  Very High: >=500 mg/dL          LDL Cholesterol 05/02/2024 71  <100 mg/dL Final    Desirable <100 mg/dL   Borderline 100-129 mg/dL   High     >=130mg/dL        VLDL 05/02/2024 11  0 - 30 mg/dL Final    Non HDL Chol 05/02/2024 85  <130 mg/dL Final    Desirable  <130 mg/dL   Borderline  130-159 mg/dL   High        160-189 mg/dL       Very high >=190 mg/dL        Patient Fasting for Lipid? 05/02/2024 Yes   Final    Glucose 05/02/2024 101 (H)  70 - 99 mg/dL Final    Sodium 05/02/2024 139  136  - 145 mmol/L Final    Potassium 05/02/2024 3.8  3.5 - 5.1 mmol/L Final    Chloride 05/02/2024 107  98 - 112 mmol/L Final    CO2 05/02/2024 26.0  21.0 - 32.0 mmol/L Final    Anion Gap 05/02/2024 6  0 - 18 mmol/L Final    BUN 05/02/2024 17  9 - 23 mg/dL Final    Creatinine 05/02/2024 1.00  0.70 - 1.30 mg/dL Final    Calcium, Total 05/02/2024 9.5  8.5 - 10.1 mg/dL Final    Calculated Osmolality 05/02/2024 290  275 - 295 mOsm/kg Final    eGFR-Cr 05/02/2024 93  >=60 mL/min/1.73m2 Final    AST 05/02/2024 25  15 - 37 U/L Final    ALT 05/02/2024 42  16 - 61 U/L Final    Alkaline Phosphatase 05/02/2024 72  45 - 117 U/L Final    Bilirubin, Total 05/02/2024 1.4  0.1 - 2.0 mg/dL Final    Total Protein 05/02/2024 7.1  6.4 - 8.2 g/dL Final    Albumin 05/02/2024 3.8  3.4 - 5.0 g/dL Final    Globulin  05/02/2024 3.3  2.8 - 4.4 g/dL Final    A/G Ratio 05/02/2024 1.2  1.0 - 2.0 Final    Patient Fasting for CMP? 05/02/2024 Yes   Final       ASSESSMENT   Imp: History of colon polyp  PLan: Colonoscopy discussed with patient risk of bleeding and bowel perforation with surgery to repair      Meds & Refills for this Visit:  Requested Prescriptions      No prescriptions requested or ordered in this encounter       Imaging & Consults:  None

## 2024-10-07 ENCOUNTER — TELEPHONE (OUTPATIENT)
Dept: FAMILY MEDICINE CLINIC | Facility: CLINIC | Age: 49
End: 2024-10-07

## 2024-10-07 NOTE — TELEPHONE ENCOUNTER
Pt states he has new insurance - [employer].  He states work told him it was a PPO but it is not stated on the card.  Claims sent to [employer], [id number], [place] [address].   Also stated card mentions [facility].   Pt states he will upload a picture of card through a [employer] message.  Pt advised member ID coming up invalid and [employer] saying pt has no insurance.  Member ID [id number].

## 2024-10-25 NOTE — TELEPHONE ENCOUNTER
Diabetes Medication Protocol Eaihyn87/25/2024 12:58 PM   Protocol Details Last A1C < 7.5 and within past 6 months    Microalbumin procedure in past 12 months or taking ACE/ARB    In person appointment or virtual visit in the past 6 mos or appointment in next 3 mos    EGFRCR or GFRNAA > 50    GFR in the past 12 months      Routing to provider per protocol.   METFORMIN 500 MG Oral Tab   Last refilled on 6/28/24 for #30  with 3 rf.   Last labs 5/2/24.   Last seen on 7/1/24.     No future appointments.       Thank you.

## 2024-11-05 ENCOUNTER — PATIENT MESSAGE (OUTPATIENT)
Dept: FAMILY MEDICINE CLINIC | Facility: CLINIC | Age: 49
End: 2024-11-05

## 2024-11-19 ENCOUNTER — MED REC SCAN ONLY (OUTPATIENT)
Dept: FAMILY MEDICINE CLINIC | Facility: CLINIC | Age: 49
End: 2024-11-19

## 2024-11-19 ENCOUNTER — TELEPHONE (OUTPATIENT)
Dept: FAMILY MEDICINE CLINIC | Facility: CLINIC | Age: 49
End: 2024-11-19

## 2024-11-21 ENCOUNTER — TELEPHONE (OUTPATIENT)
Facility: LOCATION | Age: 49
End: 2024-11-21

## 2024-11-21 NOTE — TELEPHONE ENCOUNTER
No prior authorization required for cpt code 97028. Ref #: [id number]. Spoke with Odilia JESUS @ 4:08pm on 11/21

## 2024-11-25 RX ORDER — BUDESONIDE AND FORMOTEROL FUMARATE DIHYDRATE 160; 4.5 UG/1; UG/1
2 AEROSOL RESPIRATORY (INHALATION) DAILY
Qty: 10.2 G | Refills: 3 | Status: SHIPPED | OUTPATIENT
Start: 2024-11-25

## 2024-11-25 NOTE — TELEPHONE ENCOUNTER
Asthma & COPD Medication Protocol Vfpwuj0711/24/2024 12:50 PM   Protocol Details ACT Score greater than or equal to 20    ACT recorded in the last 12 months    Appointment in past 6 or next 3 months     Routing to provider per protocol.   Budesonide-Formoterol Fumarate 160-4.5 MCG/ACT Inhalation Aerosol   Last refilled on 1/28/24 for #30.6g  with 2 rf.   Last labs 5/2/24.   Last seen on 7/1/24.     No future appointments.       Thank you.

## 2024-11-26 ENCOUNTER — PATIENT MESSAGE (OUTPATIENT)
Dept: FAMILY MEDICINE CLINIC | Facility: CLINIC | Age: 49
End: 2024-11-26

## 2024-11-26 RX ORDER — FLUTICASONE PROPIONATE AND SALMETEROL 250; 50 UG/1; UG/1
1 POWDER RESPIRATORY (INHALATION) 2 TIMES DAILY
Qty: 1 EACH | Refills: 3 | Status: SHIPPED | OUTPATIENT
Start: 2024-11-26

## 2025-01-23 ENCOUNTER — APPOINTMENT (OUTPATIENT)
Dept: DERMATOLOGY | Age: 50
End: 2025-01-23

## 2025-01-23 DIAGNOSIS — L82.1 OTHER SEBORRHEIC KERATOSIS: ICD-10-CM

## 2025-01-23 DIAGNOSIS — I87.2 VENOUS STASIS DERMATITIS: ICD-10-CM

## 2025-01-23 DIAGNOSIS — D22.9 MULTIPLE BENIGN NEVI: Primary | ICD-10-CM

## 2025-01-23 DIAGNOSIS — Z12.83 SKIN CANCER SCREENING: ICD-10-CM

## 2025-01-23 DIAGNOSIS — D23.9 DERMATOFIBROMA OF MULTIPLE SITES: ICD-10-CM

## 2025-01-23 PROCEDURE — 99213 OFFICE O/P EST LOW 20 MIN: CPT | Performed by: DERMATOLOGY

## 2025-02-24 NOTE — TELEPHONE ENCOUNTER
Pt failed refill protocol for the following reasons:    Requested Renewals     Name from pharmacy: METFORMIN 500MG TABLETS         Will file in chart as: METFORMIN 500 MG Oral Tab    Sig: TAKE 1 TABLET(500 MG) BY MOUTH DAILY WITH BREAKFAST    Disp: 30 tablet    Refills: 3 (Pharmacy requested: Not specified)    Start: 2/22/2025    Class: Normal    Non-formulary    Last ordered: 4 months ago (10/25/2024) by Pablo Jo MD    Last refill: 1/20/2025    Rx #: [id number]    Diabetes Medication Protocol Jhrmto4402/22/2025 08:59 PM   Protocol Details Last A1C < 7.5 and within past 6 months    In person appointment or virtual visit in the past 6 mos or appointment in next 3 mos    Microalbumin procedure in past 12 months or taking ACE/ARB    EGFRCR or GFRNAA > 50    GFR in the past 12 months    Medication is active on med list      [facility] at: [id number] [place] [address] [facility] [place] [address], [phone], [phone]          Last refill: 10/25/24  Last appt: 7/1/24  Next appt: No future appointments.      Forward to Dr. Law, please advise on refills. Thank you.

## 2025-04-07 ENCOUNTER — LAB ENCOUNTER (OUTPATIENT)
Dept: LAB | Age: 50
End: 2025-04-07
Attending: INTERNAL MEDICINE
Payer: COMMERCIAL

## 2025-04-07 DIAGNOSIS — R73.03 BORDERLINE DIABETES: ICD-10-CM

## 2025-04-07 DIAGNOSIS — E78.2 MIXED HYPERLIPIDEMIA: Primary | ICD-10-CM

## 2025-04-07 LAB
ALBUMIN SERPL-MCNC: 4.7 G/DL (ref 3.2–4.8)
ALBUMIN/GLOB SERPL: 2 {RATIO} (ref 1–2)
ALP LIVER SERPL-CCNC: 79 U/L
ALT SERPL-CCNC: 38 U/L
ANION GAP SERPL CALC-SCNC: 10 MMOL/L (ref 0–18)
AST SERPL-CCNC: 29 U/L (ref ?–34)
BILIRUB SERPL-MCNC: 1.6 MG/DL (ref 0.3–1.2)
BUN BLD-MCNC: 19 MG/DL (ref 9–23)
CALCIUM BLD-MCNC: 10.2 MG/DL (ref 8.7–10.6)
CHLORIDE SERPL-SCNC: 105 MMOL/L (ref 98–112)
CHOLEST SERPL-MCNC: 176 MG/DL (ref ?–200)
CO2 SERPL-SCNC: 28 MMOL/L (ref 21–32)
CREAT BLD-MCNC: 1.07 MG/DL
EGFRCR SERPLBLD CKD-EPI 2021: 86 ML/MIN/1.73M2 (ref 60–?)
FASTING PATIENT LIPID ANSWER: YES
FASTING STATUS PATIENT QL REPORTED: YES
GLOBULIN PLAS-MCNC: 2.4 G/DL (ref 2–3.5)
GLUCOSE BLD-MCNC: 97 MG/DL (ref 70–99)
HDLC SERPL-MCNC: 54 MG/DL (ref 40–59)
LDLC SERPL CALC-MCNC: 95 MG/DL (ref ?–100)
NONHDLC SERPL-MCNC: 122 MG/DL (ref ?–130)
OSMOLALITY SERPL CALC.SUM OF ELEC: 298 MOSM/KG (ref 275–295)
POTASSIUM SERPL-SCNC: 4 MMOL/L (ref 3.5–5.1)
PROT SERPL-MCNC: 7.1 G/DL (ref 5.7–8.2)
SODIUM SERPL-SCNC: 143 MMOL/L (ref 136–145)
TRIGL SERPL-MCNC: 155 MG/DL (ref 30–149)
VLDLC SERPL CALC-MCNC: 25 MG/DL (ref 0–30)

## 2025-04-07 PROCEDURE — 80053 COMPREHEN METABOLIC PANEL: CPT

## 2025-04-07 PROCEDURE — 80061 LIPID PANEL: CPT

## 2025-04-07 PROCEDURE — 36415 COLL VENOUS BLD VENIPUNCTURE: CPT

## 2025-05-12 DIAGNOSIS — N52.01 ERECTILE DYSFUNCTION DUE TO ARTERIAL INSUFFICIENCY: Primary | ICD-10-CM

## 2025-05-19 ENCOUNTER — OFFICE VISIT (OUTPATIENT)
Dept: AUDIOLOGY | Age: 50
End: 2025-05-19
Attending: PHYSICIAN ASSISTANT

## 2025-05-19 ENCOUNTER — OFFICE VISIT (OUTPATIENT)
Dept: OTOLARYNGOLOGY | Age: 50
End: 2025-05-19

## 2025-05-19 VITALS — WEIGHT: 255 LBS | HEIGHT: 72 IN | BODY MASS INDEX: 34.54 KG/M2

## 2025-05-19 DIAGNOSIS — H93.13 TINNITUS OF BOTH EARS: ICD-10-CM

## 2025-05-19 DIAGNOSIS — H90.A32 MIXED CONDUCTIVE AND SENSORINEURAL HEARING LOSS OF LEFT EAR WITH RESTRICTED HEARING OF RIGHT EAR: Primary | ICD-10-CM

## 2025-05-19 DIAGNOSIS — H90.A21 SENSORINEURAL HEARING LOSS (SNHL) OF RIGHT EAR WITH RESTRICTED HEARING OF LEFT EAR: Primary | ICD-10-CM

## 2025-05-19 DIAGNOSIS — H90.A32 MIXED CONDUCTIVE AND SENSORINEURAL HEARING LOSS OF LEFT EAR WITH RESTRICTED HEARING OF RIGHT EAR: ICD-10-CM

## 2025-05-19 PROCEDURE — 92557 COMPREHENSIVE HEARING TEST: CPT | Performed by: AUDIOLOGIST

## 2025-05-19 PROCEDURE — 99213 OFFICE O/P EST LOW 20 MIN: CPT | Performed by: PHYSICIAN ASSISTANT

## 2025-05-23 ENCOUNTER — APPOINTMENT (OUTPATIENT)
Dept: OTOLARYNGOLOGY | Age: 50
End: 2025-05-23

## 2025-06-06 RX ORDER — FLUTICASONE PROPIONATE AND SALMETEROL 250; 50 UG/1; UG/1
1 POWDER RESPIRATORY (INHALATION) 2 TIMES DAILY
Qty: 60 EACH | Refills: 2 | Status: SHIPPED | OUTPATIENT
Start: 2025-06-06

## 2025-06-06 NOTE — TELEPHONE ENCOUNTER
Diabetes Medication Protocol Bxnsjx4606/06/2025 08:08 AM   Protocol Details Last A1C < 7.5 and within past 6 months    In person appointment or virtual visit in the past 6 mos or appointment in next 3 mos    Microalbumin procedure in past 12 months or taking ACE/ARB    EGFRCR or GFRNAA > 50    GFR in the past 12 months    Medication is active on med list   Routing to provider per protocol.   METFORMIN 500 MG Oral Tab   Last refilled on 2/24/25 for #30  with 3 rf.   Last labs 4/7/25.   Last seen on 7/1/24.       Future Appointments   Date Time Provider Department Center   7/3/2025  9:30 AM Seun Petersen MD [id number]          Thank you.       Asthma & COPD Medication Protocol Fxferl2306/06/2025 08:08 AM   Protocol Details ACT Score greater than or equal to 20    Appointment in past 6 or next 3 months    ACT recorded in the last 12 months    Medication is active on med list      Routing to provider per protocol.   fluticasone-salmeterol (WIXELA INHUB) 250-50 MCG/ACT Inhalation Aerosol Powder, Breath Activated   Last refilled on 11/26/24 for #1 each  with 3 rf.

## 2025-07-03 ENCOUNTER — OFFICE VISIT (OUTPATIENT)
Facility: LOCATION | Age: 50
End: 2025-07-03
Payer: COMMERCIAL

## 2025-07-03 DIAGNOSIS — R82.90 URINE FINDING: Primary | ICD-10-CM

## 2025-07-03 DIAGNOSIS — E29.1 HYPOGONADISM IN MALE: ICD-10-CM

## 2025-07-03 LAB
APPEARANCE: CLEAR
BILIRUBIN: NEGATIVE
GLUCOSE (URINE DIPSTICK): NEGATIVE MG/DL
KETONES (URINE DIPSTICK): NEGATIVE MG/DL
LEUKOCYTES: NEGATIVE
MULTISTIX LOT#: NORMAL NUMERIC
NITRITE, URINE: NEGATIVE
OCCULT BLOOD: NEGATIVE
PH, URINE: 6.5 (ref 4.5–8)
PROTEIN (URINE DIPSTICK): NEGATIVE MG/DL
SPECIFIC GRAVITY: 1.02 (ref 1–1.03)
URINE-COLOR: YELLOW
UROBILINOGEN,SEMI-QN: 0.2 MG/DL (ref 0–1.9)

## 2025-07-03 PROCEDURE — 81003 URINALYSIS AUTO W/O SCOPE: CPT | Performed by: UROLOGY

## 2025-07-03 PROCEDURE — 51798 US URINE CAPACITY MEASURE: CPT | Performed by: UROLOGY

## 2025-07-03 PROCEDURE — 99204 OFFICE O/P NEW MOD 45 MIN: CPT | Performed by: UROLOGY

## 2025-07-03 RX ORDER — SILDENAFIL 100 MG/1
50 TABLET, FILM COATED ORAL
Qty: 30 TABLET | Refills: 5 | Status: SHIPPED | OUTPATIENT
Start: 2025-07-03

## 2025-07-03 NOTE — PROGRESS NOTES
Urology Clinic Note - New Patient    Referring Provider:  No referring provider defined for this encounter.     Primary Care Provider:  Zach Carver DO     Chief Complaint:   ED, hypogonadism     HPI:     Khang Gamez is a 50 year old male with history of asthma, HLD, GERD referred for erectile dysfunction and hypogonadism.    Patient has never seen urology previously.  He has a several year history of erectile dysfunction.  He had a low testosterone of 262 in 2024.  He has been on testosterone therapy in the past, however he was on it for a very short amount of time and does not recall the details.  Unclear if testosterone was checked while he was on therapy.  He is unsure if he had benefit in his symptoms while on therapy.  Patient does have ongoing low libido, erectile dysfunction and low energy.  Patient is a non-smoker.  No pertinent family history.  Works in water tank maintenance.  Patient is , patient reports trouble maintaining and achieving erections.  He has not trialed any therapies yet.         PSA:  No results found for: \"PSA\", \"PERCENTPSA\", \"PSAS\", \"PSAULTRA\", \"QPSA\", \"PSATOT\", \"TOTPSADX\", \"TOTPSASCREEN\"   Last Cr was 1.07 done on 4/7/2025.      History:     Past Medical History:    Asthma (HCC)    Elevated liver enzymes    Esophageal reflux    High cholesterol    Hyperlipidemia    Prepatellar bursitis    Routine general medical examination at a health care facility    Unspecified asthma(493.90)       Past Surgical History:   Procedure Laterality Date    Colonoscopy N/A 10/28/2016    Procedure: COLONOSCOPY;  Surgeon: Grover Wagner DO;  Location:  ENDOSCOPY    Colonoscopy N/A 11/1/2021    Procedure: COLONOSCOPY with Hot Polypectomy & ESOPHAGOGASTRODUODENOSCOPY (EGD) with Biopsy;  Surgeon: Grover Wagner DO;  Location:  ENDOSCOPY       Family History   Problem Relation Age of Onset    Breast Cancer Mother        Social History     Socioeconomic History    Marital status:     Occupational History    Occupation:    Tobacco Use    Smoking status: Never    Smokeless tobacco: Current     Last attempt to quit: 10/20/2010   Vaping Use    Vaping status: Never Used   Substance and Sexual Activity    Alcohol use: Yes     Alcohol/week: 3.0 - 4.0 standard drinks of alcohol     Types: 3 - 4 Cans of beer per week     Comment: per day    Drug use: No    Sexual activity: Yes     Partners: Female   Other Topics Concern    Seat Belt Yes     Comment: 100%     Social Drivers of Health      [facility]    Housing Stability       Medications (Active prior to today's visit):  Current Outpatient Medications   Medication Sig Dispense Refill    fluticasone-salmeterol (WIXELA INHUB) 250-50 MCG/ACT Inhalation Aerosol Powder, Breath Activated Inhale 1 puff into the lungs 2 (two) times daily. 60 each 2    metFORMIN 500 MG Oral Tab Take 1 tablet (500 mg total) by mouth daily with breakfast. 90 tablet 2    PEG 3350-KCl-Na Bicarb-NaCl (TRILYTE) 420 g Oral Recon Soln Starting at 4:00 pm the night before procedure, drink 8 ounces of the prep every 15-20 minutes until finished 1 each 0    testosterone cypionate 200 mg/mL Intramuscular Solution Inject 0.13 mL (26 mg total) into the muscle every 14 (fourteen) days. 10 mL 2    Syringe, Disposable, 1 ML Does not apply Misc For use with testosterone 0.3 ml Q 2weeks 100 each 0    Needle, Disp, 23G X 1-1/2\" Does not apply Misc Use with 1 ml syringe, draw 0.3 ml testosterone, inject IM Q2 weeks 100 each 0    albuterol 108 (90 Base) MCG/ACT Inhalation Aero Soln Inhale 2 puffs into the lungs every 4 (four) hours as needed for Wheezing. 8.5 g 3    fluticasone propionate 50 MCG/ACT Nasal Suspension fluticasone propionate 50 mcg/actuation nasal spray,suspension, [RxNorm: 7045613]      rosuvastatin 10 MG Oral Tab Take 1 tablet (10 mg total) by mouth nightly.      clobetasol 0.05 % External Ointment Apply to the affected area bid for 10 days  60 g 3       Allergies:  Not on File      Review of Systems:   A comprehensive 10-point review of systems was completed.  Pertinent positives and negatives are noted in the the HPI.    Physical Exam:   CONSTITUTIONAL: Well developed, well nourished, in no acute distress  NEUROLOGIC: Alert and oriented  HEAD: Normocephalic, atraumatic  ENT: Hearing intact   RESPIRATORY: Normal respiratory effort  SKIN: No evident rashes  ABDOMEN: Soft, non-tender, non-distended     No results found.      Assessment & Plan:     Hypogonadism    I spoke with him in detail regarding androgen replacement therapy with supplemental testosterone.  I discussed the various methods of delivery including AndroGel, Testopel, testosterone injections.  I discussed the risk of polycythemia and need for monitoring of blood work.  I discussed that at this time there is not definitive evidence whether testosterone increases or decreases the risk of cardiovascular events.  I discussed that research shows there is no increased risk of prostate cancer with ART.  Lastly, I informed him that testosterone replacement therapy can cause infertility all on the medication if he is attempting to have children.    - Labs: Testosterone, free testosterone, LH, estradiol, PSA, hemoglobin/hematocrit  Patient unsure if he will want to move forward testosterone therapy if confirmed to be low.  We will discuss more at his follow-up       ED    -Lifestyle modifications recommended, including regular exercise, healthy diet, do not smoke, limit alcohol intake, ensure good control of other medical problems   -Sildenafil 50 mg as needed (side effects, risks, benefits discussed)  Other alternative options also discussed   Check T labs as above       Return in 2-3 mo to discuss the above         Thank you for this consult.    I have personally reviewed all relevant medical records, labs, and imaging.       Seun Petersen MD  Staff Urologist  [facility]  Office:  [phone]

## 2025-07-07 ENCOUNTER — LAB ENCOUNTER (OUTPATIENT)
Dept: LAB | Age: 50
End: 2025-07-07
Attending: UROLOGY
Payer: COMMERCIAL

## 2025-07-07 DIAGNOSIS — E29.1 HYPOGONADISM IN MALE: ICD-10-CM

## 2025-07-07 LAB
COMPLEXED PSA SERPL-MCNC: 0.73 NG/ML (ref ?–4)
ERYTHROCYTE [DISTWIDTH] IN BLOOD BY AUTOMATED COUNT: 12.3 %
ESTRADIOL SERPL-MCNC: 32.4 PG/ML (ref ?–39.8)
HCT VFR BLD AUTO: 46.1 % (ref 39–53)
HGB BLD-MCNC: 15.3 G/DL (ref 13–17.5)
LH SERPL-ACNC: 1.2 MIU/ML (ref 1.5–9.3)
MCH RBC QN AUTO: 28.3 PG (ref 26–34)
MCHC RBC AUTO-ENTMCNC: 33.2 G/DL (ref 31–37)
MCV RBC AUTO: 85.2 FL (ref 80–100)
PLATELET # BLD AUTO: 271 10(3)UL (ref 150–450)
RBC # BLD AUTO: 5.41 X10(6)UL (ref 4.3–5.7)
TESTOST SERPL-MCNC: 214.75 NG/DL (ref 197.44–669.58)
WBC # BLD AUTO: 6.5 X10(3) UL (ref 4–11)

## 2025-07-07 PROCEDURE — 84403 ASSAY OF TOTAL TESTOSTERONE: CPT

## 2025-07-07 PROCEDURE — 85027 COMPLETE CBC AUTOMATED: CPT

## 2025-07-07 PROCEDURE — 82670 ASSAY OF TOTAL ESTRADIOL: CPT

## 2025-07-07 PROCEDURE — 83002 ASSAY OF GONADOTROPIN (LH): CPT

## 2025-07-07 PROCEDURE — 36415 COLL VENOUS BLD VENIPUNCTURE: CPT

## 2025-07-07 PROCEDURE — 84146 ASSAY OF PROLACTIN: CPT

## 2025-07-08 LAB — PROLACTIN SERPL-MCNC: 12 NG/ML (ref 2.1–17.7)

## 2025-07-09 ENCOUNTER — TELEPHONE (OUTPATIENT)
Dept: SURGERY | Facility: CLINIC | Age: 50
End: 2025-07-09

## 2026-01-28 ENCOUNTER — APPOINTMENT (OUTPATIENT)
Dept: DERMATOLOGY | Age: 51
End: 2026-01-28

## (undated) DEVICE — FILTERLINE NASAL ADULT O2/CO2

## (undated) DEVICE — SNARE CAPTIFLEX MICRO-OVL OLY

## (undated) DEVICE — ENDOSCOPY PACK UPPER: Brand: MEDLINE INDUSTRIES, INC.

## (undated) DEVICE — ENDOSCOPY PACK - LOWER: Brand: MEDLINE INDUSTRIES, INC.

## (undated) DEVICE — STERILE POLYISOPRENE POWDER-FREE SURGICAL GLOVES: Brand: PROTEXIS

## (undated) DEVICE — Device: Brand: DEFENDO AIR/WATER/SUCTION AND BIOPSY VALVE

## (undated) DEVICE — REM POLYHESIVE ADULT PATIENT RETURN ELECTRODE: Brand: VALLEYLAB

## (undated) DEVICE — TRAP 4 CPTR CHMBR N EZ INLN

## (undated) DEVICE — 3M™ RED DOT™ MONITORING ELECTRODE WITH FOAM TAPE AND STICKY GEL, 50/BAG, 20/CASE, 72/PLT 2570: Brand: RED DOT™

## (undated) DEVICE — MEDI-VAC SUCTION HANDLE REGULAR CAPACITY: Brand: CARDINAL HEALTH

## (undated) DEVICE — 1200CC GUARDIAN II: Brand: GUARDIAN

## (undated) DEVICE — MEDI-VAC NON-CONDUCTIVE SUCTION TUBING: Brand: CARDINAL HEALTH

## (undated) DEVICE — FORCEP BIOPSY RJ4 LG CAP W/ND

## (undated) NOTE — LETTER
Khang Gamez   [address] [place] [id number]           Dear Khang Gamez     Our records indicate that you have outstanding lab work and or testing that was ordered for you and has not yet been completed:  Lab Frequency Next Occurrence   Testosterone,Free And Total (Female/Child) [E] Once 02/09/2024      To provide you with the best possible care, please complete these orders at your earliest convenience. If you have recently completed these orders please disregard this letter.     If you have any questions please call the office at [phone].     Thank you,     [facility]

## (undated) NOTE — LETTER
R[place]   [address] [address]           Dear Anam Shabazz records indicate that you have outstanding lab work and or testing that was ordered for you and has not yet been completed:  Lab Frequency Next Occurrence   HEMOGLOBIN A1C Once 01/20/2023      To provide you with the best possible care, please complete these orders at your earliest convenience. If you have recently completed these orders please disregard this letter. If you have any questions please call the office at [phone].      Thank you,     [facility]

## (undated) NOTE — LETTER
03/11/19        Unique Jorge  [address] [address]      Dear Ramesh Ball,    [address] records indicate that you have outstanding lab work and or testing that was ordered for you and has not yet been completed:  Lab Frequency Next Occurrence   COMP

## (undated) NOTE — LETTER
24    Patient: Khang Gamez  : 1975 Visit date: 2024    Dear  Zach Carver DO    Thank you for referring Khang Gamez to my practice.  Please find my assessment and plan below.        I saw Khang in the office he presents with request for colonoscopy. He is currently scheduled. Thank You Forrest  Sincerely,       Grover Wagner DO   CC:   No Recipients

## (undated) NOTE — LETTER
Patient Name: Himanshu Olmedo  : 1975  MRN: [id number]  Patient Address: [address]  [place] [address]      Coronavirus Disease 2019 (COVID-19)     [facility] is committed to the safety and well-being of our patients, members, em carefully. If your symptoms get worse, call your healthcare provider immediately. 3. Get rest and stay hydrated.    4. If you have a medical appointment, call the healthcare provider ahead of time and tell them that you have or may have COVID-19.  5. For m of fever-reducing medications; and  · Improvement in respiratory symptoms (e.g., cough, shortness of breath); and  · At least 10 days have passed since symptoms first appeared OR if asymptomatic patient or date of symptom onset is unclear then use 10 days donors must:    · Have had a confirmed diagnosis of COVID-19  · Be symptom-free for at least 14 days*    *Some people will be required to have a repeat COVID-19 test in order to be eligible to donate.  If you’re instructed by Fran that a repeat test is r random. Researchers are trying to identify similarities between people with a Post-COVID condition to better understand if there are risk factors. How do I prevent a Post-COVID condition?   The best way to prevent the long-term symptoms of COVID-19 is

## (undated) NOTE — Clinical Note
Juliet Chen saw Clayton Rizo in the office today he presents with complaint of chronic GERD and has a history of colon polyps. I am scheduling him for EGD and colonoscopy in the next couple of weeks.   Thank you Beck Loo

## (undated) NOTE — MR AVS SNAPSHOT
[address] [facility] [address] [phone]               Thank you for choosing us for your health care visit with [facility].   We are glad to serve you and happy to provide you with this medications prescribed for you. Read the directions carefully, and ask your doctor or other care provider to review them with you. Today's Orders     Lipid Panel [E]    Complete by:   Feb 01, 2017 (Approximate)    Assoc Dx:  Mixed hyperlipidemia active are less likely to develop some chronic diseases than adults who are inactive.      HOW TO GET STARTED: HOW TO STAY MOTIVATED:   Start activities slowly and build up over time Do what you like   Get your heart pumping – brisk walking, biking, swimmin

## (undated) NOTE — LETTER
08/27/19        Charity Moore  [address] [place] [address]      Dear Dionicio Erickson,    [address] [facility] records indicate that you have outstanding lab work and or testing that was ordered for you and has not yet been completed:  Lab Frequency Next Occurrence   HEMOG

## (undated) NOTE — Clinical Note
06/01/2017        Bere [address]      Dear Cinthia Alberto,    [address] [facility] records indicate that you have outstanding fasting lab work and or testing that was ordered for you and has not yet been completed:  Lab Frequency Next Occurr

## (undated) NOTE — LETTER
Brenda Starks  [address]  [address]    3/5/2020      Dear  Brenda Starks    In order to provide the highest quality care, ANALIA Portillo uses a sophisticated computer system to track our patient's records

## (undated) NOTE — Clinical Note
[address] [facility] [address]  [phone]        5/2/2017        Regina Gamez  [address] [address]      Dear Crystal Cox,      To help us provide

## (undated) NOTE — LETTER
Mary Short  [address] [place] [address]    2/8/2019      Dear Mary Short    In order to provide the highest quality care, ANALIA Farrell uses a sophisticated computer system to track our patient's records.